# Patient Record
Sex: FEMALE | Race: WHITE | NOT HISPANIC OR LATINO | Employment: OTHER | URBAN - METROPOLITAN AREA
[De-identification: names, ages, dates, MRNs, and addresses within clinical notes are randomized per-mention and may not be internally consistent; named-entity substitution may affect disease eponyms.]

---

## 2017-03-02 ENCOUNTER — OFFICE VISIT (OUTPATIENT)
Dept: FAMILY MEDICINE | Facility: CLINIC | Age: 77
End: 2017-03-02
Payer: COMMERCIAL

## 2017-03-02 VITALS
DIASTOLIC BLOOD PRESSURE: 80 MMHG | OXYGEN SATURATION: 95 % | BODY MASS INDEX: 28.29 KG/M2 | RESPIRATION RATE: 15 BRPM | SYSTOLIC BLOOD PRESSURE: 120 MMHG | HEART RATE: 93 BPM | WEIGHT: 170 LBS

## 2017-03-02 DIAGNOSIS — R00.0 TACHYCARDIA: ICD-10-CM

## 2017-03-02 DIAGNOSIS — L21.9 SEBORRHEIC DERMATITIS: ICD-10-CM

## 2017-03-02 DIAGNOSIS — F41.9 ANXIETY: Primary | ICD-10-CM

## 2017-03-02 PROCEDURE — 99214 OFFICE O/P EST MOD 30 MIN: CPT | Performed by: INTERNAL MEDICINE

## 2017-03-02 PROCEDURE — 93000 ELECTROCARDIOGRAM COMPLETE: CPT | Performed by: INTERNAL MEDICINE

## 2017-03-02 RX ORDER — HYDROXYZINE PAMOATE 25 MG/1
25 CAPSULE ORAL 3 TIMES DAILY PRN
Qty: 20 CAPSULE | Refills: 1 | Status: SHIPPED | OUTPATIENT
Start: 2017-03-02 | End: 2017-10-30

## 2017-03-02 RX ORDER — TRIAMCINOLONE ACETONIDE 1 MG/G
CREAM TOPICAL
Qty: 30 G | Refills: 0 | Status: SHIPPED | OUTPATIENT
Start: 2017-03-02 | End: 2017-10-30

## 2017-03-02 RX ORDER — ESTRADIOL 0.1 MG/G
1 CREAM VAGINAL
COMMUNITY
Start: 2017-02-24 | End: 2018-07-16

## 2017-03-02 RX ORDER — CITALOPRAM HYDROBROMIDE 10 MG/1
10 TABLET ORAL DAILY
Qty: 30 TABLET | Refills: 3 | Status: SHIPPED | OUTPATIENT
Start: 2017-03-02 | End: 2017-03-09

## 2017-03-02 NOTE — PROGRESS NOTES
SUBJECTIVE:                                                    Sommer Jaimes is a 76 year old female who presents to clinic today for the following health issues:    Concern - has been feeling anxious and had a couple of spells in the past couple of days     Onset: few episodes in the past 24 hours    Description:   Feels anxious and heart racing    Intensity: moderate    Progression of Symptoms: same and intermittent    Accompanying Signs & Symptoms:  Just feels very anxious   Previous history of similar problem: yes    Precipitating factors:   Worsened by: uncertain    Alleviating factors:  Improved by: Has taken a medication that starts with an M ? (meclizine)       Therapies Tried and outcome: unsure what she has tried.     Problem list and histories reviewed & adjusted, as indicated.  Additional history: as documented  Labs reviewed in EPIC    BP Readings from Last 3 Encounters:   03/02/17 120/80   10/19/16 124/70   03/11/16 128/62    Wt Readings from Last 3 Encounters:   03/02/17 170 lb (77.1 kg)   10/19/16 179 lb 9.6 oz (81.5 kg)   11/13/15 175 lb (79.4 kg)        Reviewed and updated as needed this visit by clinical staff  Tobacco  Allergies  Meds  Problems  Med Hx  Surg Hx  Fam Hx  Soc Hx        Reviewed and updated as needed this visit by Provider  Allergies  Meds  Problems       ROS:  CONSTITUTIONAL: NEGATIVE for fever, chills, change in weight  RESP:NEGATIVE for significant cough or SOB  CV: POSITIVE for intermittent heart racing over the past few days, NEGATIVE for chest pain or peripheral edema  GI: no change in bowels  : no urinary symptoms.  NEURO: NEGATIVE for weakness, dizziness or paresthesias  PSYCHIATRIC: POSITIVE for episodes of anxiety the past few days, POSITIVE for recent loss of close sister (11/2016) and mother of daughter-in-law (last week)    This document serves as a record of the services and decisions personally performed and made by Mera Gupta MD. It was created  on her behalf by Courtney Archer, a trained medical scribe. The creation of this document is based on the provider's statements to the medical scribe.   Courtney Archer, 2:08 PM, March 2, 2017    OBJECTIVE:                                                    /80 (BP Location: Left arm, Patient Position: Chair, Cuff Size: Adult Large)  Pulse 93  Resp 15  Wt 170 lb (77.1 kg)  SpO2 95%  BMI 28.29 kg/m2  Body mass index is 28.29 kg/(m^2).  GENERAL APPEARANCE: healthy, alert and no distress  NECK: no bruits  RESP: lungs clear to auscultation - no rales, rhonchi or wheezes  CV: regular rates and rhythm with occasional ectopic beat, normal S1 S2  MS: extremities normal- no gross deformities noted  NEURO: mentation intact and speech normal  PSYCH: mentation appears normal and affect normal/bright  Skin: thickened scale at base of scalp at nape of neck; no ulceration or cellulitis    Diagnostic Test Results:  EKG - independent review at appt: normal sinus rhythm with occasional ectopic beats; rate 89     ASSESSMENT/PLAN:                                                    (F41.9) Anxiety (primary encounter diagnosis)  Comment: increased anxiety over the past few days, recent losses - sister and mother of daughter-in-law; pt had prescription of hydroxyzine for anxiety, but was using meclizine instead; begin use of citalopram daily and use of hydroxyzine as needed  Plan: hydrOXYzine (VISTARIL) 25 MG capsule,         citalopram (CELEXA) 10 MG tablet          (R00.0) Tachycardia  Comment: EKG reviewed, normal sinus rhythm with occasional ectopic beats; rate 89  Plan: EKG 12-lead complete w/read - Clinics          (L21.9) Seborrheic dermatitis  Comment: affected area to scalp behind left ear, cream helpful when used, pt reports only occasional use, begin use more regularly  Plan: triamcinolone (KENALOG) 0.1 % cream          Fish oil or krill oil for cholesterol, patient not interested in cholesterol medication     MEDICATIONS:    Orders Placed This Encounter   Medications     triamcinolone (KENALOG) 0.1 % cream     Sig: Apply sparingly to affected area three times daily for 14 days.     Dispense:  30 g     Refill:  0     ESTRACE VAGINAL 0.1 MG/GM cream     Sig: Place 1 g vaginally twice a week     hydrOXYzine (VISTARIL) 25 MG capsule     Sig: Take 1 capsule (25 mg) by mouth 3 times daily as needed for itching or anxiety     Dispense:  20 capsule     Refill:  1     citalopram (CELEXA) 10 MG tablet     Sig: Take 1 tablet (10 mg) by mouth daily     Dispense:  30 tablet     Refill:  3     Medications Discontinued During This Encounter   Medication Reason     triamcinolone (KENALOG) 0.1 % cream Reorder     hydrOXYzine (VISTARIL) 25 MG capsule Reorder     FUTURE APPOINTMENTS:       - Follow-up visit in 6-8 weeks    Mera Gupta MD  Internal Medicine  [facility]    The information in this document, created by a medical scribe for me, accurately reflects the services I personally performed and the decisions made by me. I have reviewed and approved this document for accuracy.  Dr. Mera Gupta, 2:24 PM, March 2, 2017

## 2017-03-02 NOTE — NURSING NOTE
"Chief Complaint   Patient presents with     Anxiety     2 deaths recent of people close to her   depression and anxiety    felt like heart was racing  sudden onset.   states took a few anxiety pills several years ago but never refilled the RX.         Initial /80 (BP Location: Left arm, Patient Position: Chair, Cuff Size: Adult Large)  Pulse 93  Resp 15  Wt 170 lb (77.1 kg)  SpO2 95%  BMI 28.29 kg/m2 Estimated body mass index is 28.29 kg/(m^2) as calculated from the following:    Height as of 10/19/16: 5' 5\" (1.651 m).    Weight as of this encounter: 170 lb (77.1 kg).  Medication Reconciliation: complete    "

## 2017-03-02 NOTE — MR AVS SNAPSHOT
"              After Visit Summary   3/2/2017    Sommer Jaimes    MRN: [id number]           Patient Information     Date Of Birth          1940        Visit Information        Provider Department      3/2/2017 1:00 PM Mera Gupta MD [facility]        Today's Diagnoses     Anxiety    -  1    Tachycardia        Seborrheic dermatitis           Follow-ups after your visit        Who to contact     If you have questions or need follow up information about today's clinic visit or your schedule please contact [facility] directly at [phone].  Normal or non-critical lab and imaging results will be communicated to you by [employer], letter or phone within 4 business days after the clinic has received the results. If you do not hear from us within 7 days, please contact the clinic through [employer] or phone. If you have a critical or abnormal lab result, we will notify you by phone as soon as possible.  Submit refill requests through [employer] or call your pharmacy and they will forward the refill request to us. Please allow 3 business days for your refill to be completed.          Additional Information About Your Visit        MyChart Information     [employer] lets you send messages to your doctor, view your test results, renew your prescriptions, schedule appointments and more. To sign up, go to [email or web address] . Click on \"Log in\" on the left side of the screen, which will take you to the Welcome page. Then click on \"Sign up Now\" on the right side of the page.     You will be asked to enter the access code listed below, as well as some personal information. Please follow the directions to create your username and password.     Your access code is: [email or web address]  Expires: 2017  2:22 PM     Your access code will  in 90 days. If you need help or a new code, please call your [facility] or [phone].        Care EveryWhere ID     This is your Care EveryWhere " ID. This could be used by other organizations to access your [place] medical records  [id number]        Your Vitals Were     Pulse Respirations Pulse Oximetry BMI (Body Mass Index)          93 15 95% 28.29 kg/m2         Blood Pressure from Last 3 Encounters:   03/02/17 120/80   10/19/16 124/70   03/11/16 128/62    Weight from Last 3 Encounters:   03/02/17 170 lb (77.1 kg)   10/19/16 179 lb 9.6 oz (81.5 kg)   11/13/15 175 lb (79.4 kg)              We Performed the Following     EKG 12-lead complete w/read - Clinics          Today's Medication Changes          These changes are accurate as of: 3/2/17  2:22 PM.  If you have any questions, ask your nurse or doctor.               Start taking these medicines.        Dose/Directions    citalopram 10 MG tablet   Commonly known as:  celeXA   Used for:  Anxiety   Started by:  Mera Gupta MD        Dose:  10 mg   Take 1 tablet (10 mg) by mouth daily   Quantity:  30 tablet   Refills:  3            Where to get your medicines      [place] [facility] [place] [place] [address]  [address], [facility] [address]     Phone:  [phone]     citalopram 10 MG tablet    hydrOXYzine 25 MG capsule    triamcinolone 0.1 % cream                Primary Care Provider Office Phone # Fax #    Mera Gupta [phone]       [facility] [address]  [facility] [address]        Thank you!     Thank you for choosing [facility]  for your care. Our goal is always to provide you with excellent care. Hearing back from our patients is one way we can continue to improve our services. Please take a few minutes to complete the written survey that you may receive in the mail after your visit with us. Thank you!             Your Updated Medication List - Protect others around you: Learn how to safely use, store and throw away your medicines at www.disposemymeds.org.          This  list is accurate as of: 3/2/17  2:22 PM.  Always use your most recent med list.                   Brand Name Dispense Instructions for use    calcium + D 600-200 MG-UNIT Tabs   Generic drug:  calcium carbonate-vitamin D      Take  by mouth.       CENTRUM SILVER per tablet      Take 1 tablet by mouth daily.       citalopram 10 MG tablet    celeXA    30 tablet    Take 1 tablet (10 mg) by mouth daily       COQ-10 PO      Take 1 tablet by mouth daily       ESTRACE VAGINAL 0.1 MG/GM cream   Generic drug:  estradiol      Place 1 g vaginally twice a week       hydrOXYzine 25 MG capsule    VISTARIL    20 capsule    Take 1 capsule (25 mg) by mouth 3 times daily as needed for itching or anxiety       PROBIOTIC DAILY PO      Take by mouth daily       triamcinolone 0.1 % cream    KENALOG    30 g    Apply sparingly to affected area three times daily for 14 days.

## 2017-03-09 ENCOUNTER — TELEPHONE (OUTPATIENT)
Dept: FAMILY MEDICINE | Facility: CLINIC | Age: 77
End: 2017-03-09

## 2017-03-09 NOTE — TELEPHONE ENCOUNTER
"Patient called to let pcp know she is no longer taking citalopram. Started med 3/2. Stated first night could not sleep. After taking second dose had \"horrible\" diarrhea for whole day. Has not taken since. Patient states is feeling better and does not want any alternative medication. Is also \"backing off\" of vistaril.    Will follow up if needed.    Citalopram removed from med list.    Sheri Velazco RN    "

## 2017-05-10 ENCOUNTER — OFFICE VISIT (OUTPATIENT)
Dept: FAMILY MEDICINE | Facility: CLINIC | Age: 77
End: 2017-05-10
Payer: COMMERCIAL

## 2017-05-10 VITALS
RESPIRATION RATE: 16 BRPM | DIASTOLIC BLOOD PRESSURE: 64 MMHG | OXYGEN SATURATION: 96 % | SYSTOLIC BLOOD PRESSURE: 130 MMHG | HEART RATE: 70 BPM | TEMPERATURE: 98.3 F | HEIGHT: 67 IN

## 2017-05-10 DIAGNOSIS — R42 DIZZINESS: ICD-10-CM

## 2017-05-10 DIAGNOSIS — F43.0 STRESS REACTION: Primary | ICD-10-CM

## 2017-05-10 PROCEDURE — 99214 OFFICE O/P EST MOD 30 MIN: CPT | Performed by: INTERNAL MEDICINE

## 2017-05-10 RX ORDER — SERTRALINE HYDROCHLORIDE 25 MG/1
12.5 TABLET, FILM COATED ORAL DAILY
Qty: 30 TABLET | Refills: 3 | Status: SHIPPED | OUTPATIENT
Start: 2017-05-10 | End: 2017-10-30

## 2017-05-10 NOTE — MR AVS SNAPSHOT
After Visit Summary   5/10/2017    Sommer Jaimes    MRN: [id number]           Patient Information     Date Of Birth          1940        Visit Information        Provider Department      5/10/2017 10:00 AM [facility] MD [facility]        Today's Diagnoses     Stress reaction    -  1    Dizziness           Follow-ups after your visit        Additional Services     PHYSICAL THERAPY REFERRAL       *This therapy referral will be filtered to a centralized scheduling office at [facility] and the patient will receive a call to schedule an appointment at a [place] location most convenient for them. *     [facility] provides Physical Therapy evaluation and treatment and many specialty services across the [place] system.  If requesting a specialty program, please choose from the list below.    If you have not heard from the scheduling office within 2 business days, please call [phone] for all locations, with the exception of [place], please call [phone].  Treatment: Evaluation & Treatment  Special Instructions/Modalities:   Special Programs: Balance/Vestibular    Please be aware that coverage of these services is subject to the terms and limitations of your health insurance plan.  Call member services at your health plan with any benefit or coverage questions.      **Note to Provider:  If you are referring outside of [place] for the therapy appointment, please list the name of the location in the  special instructions  above, print the referral and give to the patient to schedule the appointment.                  Future tests that were ordered for you today     Open Future Orders        Priority Expected Expires Ordered    US Carotid Bilateral Routine  5/10/2018 5/10/2017            Who to contact     If you have questions or need follow up information about today's clinic visit or your schedule please contact [place]  [facility] directly at [phone].  Normal or non-critical lab and imaging results will be communicated to you by MyChart, letter or phone within 4 business days after the clinic has received the results. If you do not hear from us within 7 days, please contact the clinic through [employer] or phone. If you have a critical or abnormal lab result, we will notify you by phone as soon as possible.  Submit refill requests through [employer] or call your pharmacy and they will forward the refill request to us. Please allow 3 business days for your refill to be completed.          Additional Information About Your Visit        [employer] Information     [employer] lets you send messages to your doctor, view your test results, renew your prescriptions, schedule appointments and more. To sign up, go to [email or web address] . Click on \"Log in\" on the left side of the screen, which will take you to the Welcome page. Then click on \"Sign up Now\" on the right side of the page.     You will be asked to enter the access code listed below, as well as some personal information. Please follow the directions to create your username and password.     Your access code is: [email or web address]  Expires: 2017  3:22 PM     Your access code will  in 90 days. If you need help or a new code, please call your [place] clinic or [phone].        Care EveryWhere ID     This is your Care EveryWhere ID. This could be used by other organizations to access your [place] medical records  [id number]        Your Vitals Were     Pulse Temperature Respirations Height Pulse Oximetry Breastfeeding?    70 98.3  F (36.8  C) (Oral) 16 5' 6.5\" (1.689 m) 96% No       Blood Pressure from Last 3 Encounters:   05/10/17 130/64   17 120/80   10/19/16 124/70    Weight from Last 3 Encounters:   17 170 lb (77.1 kg)   10/19/16 179 lb 9.6 oz (81.5 kg)   11/13/15 175 lb (79.4 kg)              We Performed the Following     PHYSICAL THERAPY REFERRAL  "         Today's Medication Changes          These changes are accurate as of: 5/10/17 10:25 AM.  If you have any questions, ask your nurse or doctor.               Start taking these medicines.        Dose/Directions    sertraline 25 MG tablet   Commonly known as:  ZOLOFT   Used for:  Stress reaction   Started by:  Mera Gupta MD        Dose:  12.5 mg   Take 0.5 tablets (12.5 mg) by mouth daily After 2-3 weeks may increase to 25 mg per day   Quantity:  30 tablet   Refills:  3            Where to get your medicines      These medications were sent to [facility] [place] - [address]  [address] [facility] [address]     Phone:  [phone]     sertraline 25 MG tablet                Primary Care Provider Office Phone # Fax #    Mera Gupta [phone]       [facility] [address] [facility] [address]        Thank you!     Thank you for choosing [facility]  for your care. Our goal is always to provide you with excellent care. Hearing back from our patients is one way we can continue to improve our services. Please take a few minutes to complete the written survey that you may receive in the mail after your visit with us. Thank you!             Your Updated Medication List - Protect others around you: Learn how to safely use, store and throw away your medicines at www.disposemymeds.org.          This list is accurate as of: 5/10/17 10:25 AM.  Always use your most recent med list.                   Brand Name Dispense Instructions for use    calcium + D 600-200 MG-UNIT Tabs   Generic drug:  calcium carbonate-vitamin D      Take  by mouth.       CENTRUM SILVER per tablet      Take 1 tablet by mouth daily.       COQ-10 PO      Take 1 tablet by mouth daily       ESTRACE VAGINAL 0.1 MG/GM cream   Generic drug:  estradiol      Place 1 g vaginally twice a week       hydrOXYzine 25 MG capsule    VISTARIL    20 capsule     Take 1 capsule (25 mg) by mouth 3 times daily as needed for itching or anxiety       PROBIOTIC DAILY PO      Take by mouth daily       sertraline 25 MG tablet    ZOLOFT    30 tablet    Take 0.5 tablets (12.5 mg) by mouth daily After 2-3 weeks may increase to 25 mg per day       triamcinolone 0.1 % cream    KENALOG    30 g    Apply sparingly to affected area three times daily for 14 days.

## 2017-05-10 NOTE — NURSING NOTE
"Chief Complaint   Patient presents with     Dizziness       Initial /64 (BP Location: Right arm, Patient Position: Chair, Cuff Size: Adult Large)  Pulse 70  Temp 98.3  F (36.8  C) (Oral)  Resp 16  Ht 5' 6.5\" (1.689 m)  SpO2 96%  Breastfeeding? No Estimated body mass index is 28.29 kg/(m^2) as calculated from the following:    Height as of 10/19/16: 5' 5\" (1.651 m).    Weight as of 3/2/17: 170 lb (77.1 kg).  Medication Reconciliation: complete     Aga Damon CMA (AAMA) 5/10/2017 10:00 AM      "

## 2017-05-10 NOTE — PROGRESS NOTES
"  SUBJECTIVE:                                                    Sommer Jaimes is a 76 year old female who presents to clinic today for the following health issues:      Dizziness    Duration: Intermittently for one week. She had a dental implant placed on February 3rd, where she was lying back in their dental bed, and she felt dizzy upon standing back up. Additionally, she went into a massage last week and she felt dizzy upon standing again when the massage was completed.    Description   Feeling faint:  no   Feeling like the surroundings are moving: no   Loss of consciousness or falls: no     Intensity: Moderate    Accompanying signs and symptoms:   Nausea/vomitting: no   Palpitations: no   Weakness in arms or legs: no   Vision or speech changes: no   Ringing in ears (Tinnitus): no   Hearing loss related to dizziness: no   Other (fevers/chills/sweating/dyspnea): YES - Feels \"off\"    History (similar episodes/head trauma/previous evaluation/recent bleeding): None    Precipitating or alleviating factors (new meds/chemicals): Happens only when standing up too fast  Worse with activity/head movement: YES    Therapies tried and outcome: None       Problem list and histories reviewed & adjusted, as indicated.  Additional history: as documented    Labs reviewed in EPIC    Reviewed and updated as needed this visit by clinical staff       Reviewed and updated as needed this visit by Provider         REVIEW OF SYSTEMS:  C: NEGATIVE for fever, chills, or change in weight  R: NEGATIVE for significant cough or SOB  CV: NEGATIVE for chest pain, palpitations or peripheral edema  GI: no change in bowel function  M: NEGATIVE for significant arthralgias or myalgia  N: POSITIVE for intermittent dizziness upon standing; NEGATIVE for weakness or paresthesias  P: NEGATIVE for changes in mood or affect    This document serves as a record of the services and decisions personally performed and made by Mera Gupta MD. It was created " "on her behalf by Cheri Mackay, a trained medical scribe. The creation of this document is based the provider's statements to the medical scribe.  Cheri Mackay, May 10, 2017 10:10 AM     OBJECTIVE:                                                    /64 (BP Location: Right arm, Patient Position: Chair, Cuff Size: Adult Large)  Pulse 70  Temp 98.3  F (36.8  C) (Oral)  Resp 16  Ht 1.689 m (5' 6.5\")  SpO2 96%  Breastfeeding? No  There is no height or weight on file to calculate BMI.    EXAM:  GENERAL: Patient appears healthy, alert and not distressed.  EYES: Eyes appear grossly normal to inspection, with normal conjunctivae and sclerae, EOMI  HENT: Ear canals and TM's appear normal, mouth is without ulcers or lesions, oropharynx is clear and oral mucous membranes are moist  NECK: No adenopathy present, no asymmetry, masses, or scars noted, thyroid is normal to palpation  RESP: Lungs are clear to auscultation - no rales, rhonchi or wheezes present  CV: Regular rate and rhythm, normal S1 S2 heart sounds, no ectopy, no peripheral edema present, peripheral pulses normal, no carotid bruit.  Ab: soft,  Nontender; bowel sounds present  MS: No gross musculoskeletal defects noted, no edema, gait is age appropriate without ataxia  NEURO: Patient exhibits normal strength and tone, normal speech and mentation  PSYCH: Mentation appears normal, affect is normal/bright    Diagnostic Test Results:  No results found for this or any previous visit (from the past 24 hour(s)).      ASSESSMENT/PLAN:                                                      (F43.0) Stress reaction  (primary encounter diagnosis)  Comment: Patient is sensitive to medications and could not tolerate Citalopram or Alprazolam. She continues to take Hydroxyzine as prescribed, but states \"her head feels weird\". She is interested in trying a different daily medication. Starting on Zoloft 25 MG at half the dose; 12.5 mg per day. She prefers NOT to use the " "Hydroxyzine for breakthrough symptoms  Plan: sertraline (ZOLOFT) 25 MG tablet          (R42) Dizziness  Comment: Denies vertigo, but states \"her head feels weird\" while moving quickly or when her neck is extended backwards for extended periods of time. Referred to physical therapy, and advised to follow-up for a carotid ultrasound to detect blood flow changes to head.  Plan: US Carotid Bilateral, PHYSICAL THERAPY REFERRAL              The information in this document, created by a medical scribe for me, accurately reflects the services I personally performed and the decisions made by me. I have reviewed and approved this document for accuracy.  Dr. Mera Gupta, May 10, 2017, 10:31 AM     Mera Gupta MD  Internal Medicine   [facility]    "

## 2017-05-16 ENCOUNTER — HOSPITAL ENCOUNTER (OUTPATIENT)
Dept: ULTRASOUND IMAGING | Facility: CLINIC | Age: 77
Discharge: HOME | End: 2017-05-16
Attending: INTERNAL MEDICINE | Admitting: INTERNAL MEDICINE
Payer: COMMERCIAL

## 2017-05-16 DIAGNOSIS — R42 DIZZINESS: ICD-10-CM

## 2017-05-16 PROCEDURE — 93880 EXTRACRANIAL BILAT STUDY: CPT

## 2017-05-17 ENCOUNTER — HOSPITAL ENCOUNTER (OUTPATIENT)
Dept: PHYSICAL THERAPY | Facility: CLINIC | Age: 77
End: 2017-05-17
Attending: INTERNAL MEDICINE
Payer: COMMERCIAL

## 2017-05-17 DIAGNOSIS — R42 DIZZINESS: Primary | ICD-10-CM

## 2017-05-17 PROCEDURE — G8979 MOBILITY GOAL STATUS: HCPCS | Mod: GP | Performed by: PHYSICAL THERAPIST

## 2017-05-17 PROCEDURE — G8978 MOBILITY CURRENT STATUS: HCPCS | Mod: GP | Performed by: PHYSICAL THERAPIST

## 2017-05-17 PROCEDURE — 40000840 ZZHC STATISTIC PT VESTIBULAR VISIT: Mod: GP | Performed by: PHYSICAL THERAPIST

## 2017-05-20 NOTE — PROGRESS NOTES
" 05/17/17 1000   Quick Adds   Quick Adds Vestibular Eval   Type of Visit Initial OP PT Evaluation   General Information   Start of Care Date 05/17/17   Referring Physician Mera Gupta MD    Orders Evaluate and Treat as Indicated   Medical Diagnosis Dizziness R42    Onset of illness/injury or Date of Surgery 05/17/17   Pertinent history of current problem (include personal factors and/or comorbidities that impact the POC) Dizziness started when I was at the dentist, reoccured when getting up from a message later in the week. Dizziness lasts a couple seconds. Denying room spinning sensation- unable to describe dizziness further. Reporting that she does have ringing in the ears- although this has been going on for years. Denying vision changes but reports that she does have a cataract. Per subjective reporting, dizziness does not appear to by consistent with positional changes. However, sudden movements do set off dizziness. Increased motion sensitivity while at a movie recently. Reports having headaches- feels this may be allergies or sinuses. Not having any dizziness this date.  Had Carotid US yesterday- patient reporting that the results came back fine. States that she just isn't \"feeling like myself\" and that balance has been a bit off.    Prior level of function comment IND with all mobility   Patient role/Employment history Retired  (Nursing)   Assistive Devices Comments Does not utilize an AD at this time   Patient/Family Goals Statement Resolve dizziness   Fall Risk Screen   Fall screen completed by PT   Per patient - Fall 2 or more times in past year? No   Per patient - Fall with injury in past year? No   Is patient a fall risk? No   System Outcome Measures   Outcome Measures BPPV   Dizziness Handicap Inventory (score out of 100) A decrease in score by 17.18 or greater indicates a clinically significant change in symptoms. 12   Was BPPV resolved at end of session? Yes   Pain   Pain comments Sukhwinder " denying pain   Cognitive Status Examination   Orientation orientation to person, place and time   Level of Consciousness alert   Follows Commands and Answers Questions 100% of the time   Personal Safety and Judgment intact   Memory intact   Posture   Posture Forward head position;Protracted shoulders   Range of Motion (ROM)   ROM Comment Functionally assessed through gait, bed mobility, and trnasfers. Bilateral LEs and UEs determined to be within functional norms.    Strength   Strength Comments Functionally assessed through general mobility screening. Bialteral LEs and UEs determined to be graded at least 3/5 as patient is able to lift bilateral extremities against gravity without difficulty.    Bed Mobility   Bed Mobility Comments IND   Transfer Skills   Transfer Comments IND   Gait   Gait Comments IND   Balance   Balance Comments No overt concerns for balance; demonstrating goo stability with gait, trnasfers and positional changes.    Sensory Examination   Sensory Perception no deficits were identified   Coordination   Coordination no deficits were identified   Muscle Tone   Muscle Tone no deficits were identified   Cervicogenic Screen   Neck ROM Cervical ROM screened; determined to be within normal limits and within approrpiate range for positional testing.    Vertebral Artery Test Normal   Oculomotor Exam   Smooth Pursuit Normal   Saccades Normal   VOR Normal   Rapid Head Thrust Corrective Saccade R head thrust   Rapid Head Thrust Comments Corrective saccade noted on one occasion. Patient significantly guarding against motion- difficult to assess acuurately.    Convergence Testing Normal   Infrared Goggle Exam or Frenzel Lense Exam   Vestibular Suppressant in Last 24 Hours? No   Exam completed with Infrared Goggles   Spontaneous Nystagmus Negative   Gaze Evoked Nystagmus Negative   Katharine-Hallpike (right) Upbeating R torsional   Prospect-Hallpike (right) comments Patient demonstrating large amplitude and significant  response to positional testing. Patient very anxious about symptoms experienced. Decided to treat R posterior canal due to positive test. Unable to assess other canals this session. Nystagmus lasting approximately 12 seconds.    BPPV Canal(s) R Posterior   BPPV Type Canalithasis   Planned Therapy Interventions   Planned Therapy Interventions balance training;gait training;neuromuscular re-education;ROM;strengthening;stretching;transfer training   Clinical Impression   Criteria for Skilled Therapeutic Interventions Met yes, treatment indicated   PT Diagnosis Decreased safe functional mobility   Influenced by the following impairments Dizziness with positional changes   Functional limitations due to impairments Decreased safety with transfers, gait, and positional changes   Clinical Presentation Stable/Uncomplicated   Clinical Decision Making (Complexity) Low complexity   Therapy Frequency 1 time/week   Predicted Duration of Therapy Intervention (days/wks) 6 weeks   Risk & Benefits of therapy have been explained Yes   Patient, Family & other staff in agreement with plan of care Yes   Clinical Impression Comments Patient resenting with long standing dizziness. Objective findings indicate R posterior canal BPPV. The patient will benefit from skilled PT services to address identified limitations and facilitate return to baseline level of function.    GOALS   PT Eval Goals 1;2   Goal 1   Goal Identifier Return to activity   Goal Description The aptietn will report being able to return to all normal activity without restriction due to dizziness.   Target Date 06/28/17   Goal 2   Goal Identifier DHI   Goal Description The patient will score 0/100 on DHI assessment to demonstrate a significant improvement in dizziness symptoms and the subjective percetion of handicap assoiated with dizziness.    Target Date 06/28/17   Total Evaluation Time   Total Evaluation Time (Minutes) 40

## 2017-05-20 NOTE — ADDENDUM NOTE
Encounter addended by: Katarina Patterson PT on: 5/19/2017  9:27 PM<BR>     Actions taken: Flowsheet accepted

## 2017-06-07 ENCOUNTER — HOSPITAL ENCOUNTER (OUTPATIENT)
Dept: PHYSICAL THERAPY | Facility: CLINIC | Age: 77
End: 2017-06-07
Payer: COMMERCIAL

## 2017-06-07 DIAGNOSIS — R42 DIZZINESS: Primary | ICD-10-CM

## 2017-06-07 PROCEDURE — 97112 NEUROMUSCULAR REEDUCATION: CPT | Mod: GP | Performed by: PHYSICAL THERAPIST

## 2017-06-07 PROCEDURE — 40000840 ZZHC STATISTIC PT VESTIBULAR VISIT: Mod: GP | Performed by: PHYSICAL THERAPIST

## 2017-06-14 ENCOUNTER — HOSPITAL ENCOUNTER (OUTPATIENT)
Dept: PHYSICAL THERAPY | Facility: CLINIC | Age: 77
End: 2017-06-14
Payer: COMMERCIAL

## 2017-06-14 DIAGNOSIS — R42 DIZZINESS: Primary | ICD-10-CM

## 2017-06-14 PROCEDURE — 97112 NEUROMUSCULAR REEDUCATION: CPT | Mod: GP | Performed by: PHYSICAL THERAPIST

## 2017-06-16 ENCOUNTER — HOSPITAL ENCOUNTER (OUTPATIENT)
Dept: PHYSICAL THERAPY | Facility: CLINIC | Age: 77
End: 2017-06-16
Payer: COMMERCIAL

## 2017-06-16 DIAGNOSIS — R42 DIZZINESS: Primary | ICD-10-CM

## 2017-06-16 PROCEDURE — 95992 CANALITH REPOSITIONING PROC: CPT | Mod: GP | Performed by: PHYSICAL THERAPIST

## 2017-06-16 PROCEDURE — 40000840 ZZHC STATISTIC PT VESTIBULAR VISIT: Mod: GP | Performed by: PHYSICAL THERAPIST

## 2017-06-16 PROCEDURE — 97112 NEUROMUSCULAR REEDUCATION: CPT | Mod: GP | Performed by: PHYSICAL THERAPIST

## 2017-06-29 ENCOUNTER — HOSPITAL ENCOUNTER (OUTPATIENT)
Dept: PHYSICAL THERAPY | Facility: CLINIC | Age: 77
End: 2017-06-29
Payer: COMMERCIAL

## 2017-06-29 DIAGNOSIS — R42 DIZZINESS: Primary | ICD-10-CM

## 2017-06-29 PROCEDURE — 95992 CANALITH REPOSITIONING PROC: CPT | Mod: GP | Performed by: PHYSICAL THERAPIST

## 2017-06-29 PROCEDURE — 97112 NEUROMUSCULAR REEDUCATION: CPT | Mod: GP | Performed by: PHYSICAL THERAPIST

## 2017-06-29 PROCEDURE — 40000719 ZZHC STATISTIC PT DEPARTMENT NEURO VISIT: Mod: GP | Performed by: PHYSICAL THERAPIST

## 2017-07-25 ENCOUNTER — HOSPITAL ENCOUNTER (OUTPATIENT)
Dept: PHYSICAL THERAPY | Facility: CLINIC | Age: 77
End: 2017-07-25
Payer: COMMERCIAL

## 2017-07-25 DIAGNOSIS — R42 DIZZINESS: Primary | ICD-10-CM

## 2017-07-25 PROCEDURE — 97112 NEUROMUSCULAR REEDUCATION: CPT | Mod: GP | Performed by: PHYSICAL THERAPIST

## 2017-07-25 PROCEDURE — 40000840 ZZHC STATISTIC PT VESTIBULAR VISIT: Mod: GP | Performed by: PHYSICAL THERAPIST

## 2017-07-25 NOTE — PROGRESS NOTES
"Outpatient Physical Therapy Discharge Note     Patient: Sommer Jaimes  : 1940    Beginning/End Dates of Reporting Period:  17 to 2017    Referring Provider: Mera Gupta MD     Therapy Diagnosis: Decreased safe functional mobility     Client Self Report: Patient present, indicating that room spinning sensation hasn't been an issue. The patient reports that this has not occurred since our previous visit- has not had to use the self treatment method. Notes that she will occasionally continue to feel a \"little bit off,\" though believes this to be related to her vision. Notes that she has a worsenning cataract in her L eye. Is planning to see the eye doctor in a few weeks.     Objective Measurements:  Objective Measure: DHI  Details:         Outcome Measures (most recent score): 2100              Goals:  Goal Identifier MET: return to activity   Goal Description The kalpanaietn will report being able to return to all normal activity without restriction due to dizziness.   Target Date 17   Date Met  17   Progress:     Goal Identifier DHI   Goal Description The patient will score 0/100 on DHI assessment to demonstrate a significant improvement in dizziness symptoms and the subjective percetion of handicap assoiated with dizziness.    Target Date 17   Date Met      Progress: Goal techniqucally not met per patient reporting (DHI score 2/100), however, with further discussion, feels that her dizziness has resolved and that she is unrestricted at this time.     Goal Identifier     Goal Description     Target Date     Date Met      Progress:     Goal Identifier     Goal Description     Target Date     Date Met      Progress:     Goal Identifier     Goal Description     Target Date     Date Met      Progress:     Goal Identifier     Goal Description     Target Date     Date Met      Progress:     Goal Identifier     Goal Description     Target Date     Date Met      Progress: "     Goal Identifier     Goal Description     Target Date     Date Met      Progress:     Progress Toward Goals:   Progress this reporting period: see above        Plan:  Discharge from therapy.    Discharge:    Reason for Discharge: Patient has met all goals.    Equipment Issued: none    Discharge Plan: Patient to continue home program as needed

## 2017-07-25 NOTE — ADDENDUM NOTE
Encounter addended by: Katarina Patterson PT on: 7/25/2017  3:17 PM<BR>     Actions taken: Episode resolved

## 2017-08-30 ENCOUNTER — TRANSFERRED RECORDS (OUTPATIENT)
Dept: HEALTH INFORMATION MANAGEMENT | Facility: CLINIC | Age: 77
End: 2017-08-30

## 2017-09-20 ENCOUNTER — RADIANT APPOINTMENT (OUTPATIENT)
Dept: GENERAL RADIOLOGY | Facility: CLINIC | Age: 77
End: 2017-09-20
Attending: PHYSICIAN ASSISTANT
Payer: COMMERCIAL

## 2017-09-20 ENCOUNTER — OFFICE VISIT (OUTPATIENT)
Dept: FAMILY MEDICINE | Facility: CLINIC | Age: 77
End: 2017-09-20
Payer: COMMERCIAL

## 2017-09-20 VITALS
HEART RATE: 93 BPM | SYSTOLIC BLOOD PRESSURE: 116 MMHG | OXYGEN SATURATION: 94 % | TEMPERATURE: 101.4 F | DIASTOLIC BLOOD PRESSURE: 78 MMHG | RESPIRATION RATE: 16 BRPM | HEIGHT: 67 IN

## 2017-09-20 DIAGNOSIS — R05.9 COUGH: Primary | ICD-10-CM

## 2017-09-20 DIAGNOSIS — R05.9 COUGH: ICD-10-CM

## 2017-09-20 DIAGNOSIS — J18.9 PNEUMONIA DUE TO INFECTIOUS ORGANISM, UNSPECIFIED LATERALITY, UNSPECIFIED PART OF LUNG: ICD-10-CM

## 2017-09-20 LAB
BASOPHILS # BLD AUTO: 0 10E9/L (ref 0–0.2)
BASOPHILS NFR BLD AUTO: 0.2 %
DIFFERENTIAL METHOD BLD: NORMAL
EOSINOPHIL # BLD AUTO: 0 10E9/L (ref 0–0.7)
EOSINOPHIL NFR BLD AUTO: 0.2 %
ERYTHROCYTE [DISTWIDTH] IN BLOOD BY AUTOMATED COUNT: 13.1 % (ref 10–15)
HCT VFR BLD AUTO: 46.5 % (ref 35–47)
HGB BLD-MCNC: 15.2 G/DL (ref 11.7–15.7)
LYMPHOCYTES # BLD AUTO: 1 10E9/L (ref 0.8–5.3)
LYMPHOCYTES NFR BLD AUTO: 17.4 %
MCH RBC QN AUTO: 30.5 PG (ref 26.5–33)
MCHC RBC AUTO-ENTMCNC: 32.7 G/DL (ref 31.5–36.5)
MCV RBC AUTO: 93 FL (ref 78–100)
MONOCYTES # BLD AUTO: 0.5 10E9/L (ref 0–1.3)
MONOCYTES NFR BLD AUTO: 7.7 %
NEUTROPHILS # BLD AUTO: 4.5 10E9/L (ref 1.6–8.3)
NEUTROPHILS NFR BLD AUTO: 74.5 %
PLATELET # BLD AUTO: 218 10E9/L (ref 150–450)
RBC # BLD AUTO: 4.98 10E12/L (ref 3.8–5.2)
WBC # BLD AUTO: 6 10E9/L (ref 4–11)

## 2017-09-20 PROCEDURE — 99214 OFFICE O/P EST MOD 30 MIN: CPT | Performed by: PHYSICIAN ASSISTANT

## 2017-09-20 PROCEDURE — 85025 COMPLETE CBC W/AUTO DIFF WBC: CPT | Performed by: PHYSICIAN ASSISTANT

## 2017-09-20 PROCEDURE — 71020 XR CHEST 2 VW: CPT

## 2017-09-20 PROCEDURE — 36415 COLL VENOUS BLD VENIPUNCTURE: CPT | Performed by: PHYSICIAN ASSISTANT

## 2017-09-20 RX ORDER — LEVOFLOXACIN 500 MG/1
500 TABLET, FILM COATED ORAL DAILY
Qty: 7 TABLET | Refills: 0 | Status: CANCELLED | OUTPATIENT
Start: 2017-09-20

## 2017-09-20 RX ORDER — DOXYCYCLINE HYCLATE 100 MG
100 TABLET ORAL 2 TIMES DAILY
Qty: 20 TABLET | Refills: 0 | Status: SHIPPED | OUTPATIENT
Start: 2017-09-20 | End: 2017-10-30

## 2017-09-20 RX ORDER — CODEINE PHOSPHATE AND GUAIFENESIN 10; 100 MG/5ML; MG/5ML
1 SOLUTION ORAL EVERY 8 HOURS PRN
Qty: 120 ML | Refills: 0 | Status: SHIPPED | OUTPATIENT
Start: 2017-09-20 | End: 2017-10-30

## 2017-09-20 NOTE — PROGRESS NOTES
SUBJECTIVE:   Sommer Jaimes is a 76 year old female who presents to clinic today for the following health issues:    RESPIRATORY SYMPTOMS      Duration: Since Saturday     Description  cough and chills    Severity: moderate    Accompanying signs and symptoms: no appetite     History (predisposing factors):  none    Precipitating or alleviating factors: None    Therapies tried and outcome:  Mucinex, DayQuil/NyQuil, nothing is effective.         Patient is here today complaining of a barky cough, now more productive  Ongoing since Saturday and seems to be getting worse  Has not checked temperature, has no thermometer  Has been very hot and cold  + sore throat, runny nose, + bodyaches  No ear pain, no vomiting or diarrhea  Taking OTC without relief  Admits to being tired and decreased appetite.    Problem list and histories reviewed & adjusted, as indicated.  Additional history: as documented    Patient Active Problem List   Diagnosis     Symptomatic menopausal or female climacteric states     Urethral fistula     Hematuria     CARDIOVASCULAR SCREENING; LDL GOAL LESS THAN 160     HYPERLIPIDEMIA LDL GOAL <160     Advanced directives, counseling/discussion     Anxiety     Past Surgical History:   Procedure Laterality Date     C APPENDECTOMY  1973    Appendectomy.     C NONSPECIFIC PROCEDURE      Sebaceous cyst.     C NONSPECIFIC PROCEDURE      Ganglion cyst.     C NONSPECIFIC PROCEDURE      Oral surg./Plastic surg. - chin.     COLONOSCOPY  4/19/2013    colonoscopy     COLONOSCOPY  4/19/2013    Procedure: COLONOSCOPY;  Colonoscopy ;  Surgeon: Harman Becerra MD;  Location:  GI     [facility] COLONOSCOPY THRU STOMA, DIAGNOSTIC  4/02    Dr. Tavarez, normal no polyps     HC FLEX SIGMOIDOSCOPY W/WO QUYNH SPEC BY BRUSH/WASH  1993    Flex sig. and  colonoscopy 2002     HC REMOVAL GALLBLADDER  1973    Cholecystectomy.     TONSILLECTOMY & ADENOIDECTOMY      T&A age 12       Social History   Substance Use Topics     Smoking  status: Never Smoker     Smokeless tobacco: Never Used     Alcohol use 0.0 oz/week     0 Standard drinks or equivalent per week      Comment: very seldom     Family History   Problem Relation Age of Onset     DIABETES Mother      HEART DISEASE Mother      Cancer - colorectal Father      Colon Cancer late 70's     Circulatory Sister      CHF     HEART DISEASE Sister      defib placed     Neurologic Disorder Son      Multiple sclerosis     CEREBROVASCULAR DISEASE Sister      Cancer - colorectal Other      niece in her 50's          Current Outpatient Prescriptions   Medication Sig Dispense Refill     doxycycline (VIBRA-TABS) 100 MG tablet Take 1 tablet (100 mg) by mouth 2 times daily 20 tablet 0     guaiFENesin-codeine (ROBITUSSIN AC) 100-10 MG/5ML SOLN solution Take 5 mLs by mouth every 8 hours as needed 120 mL 0     sertraline (ZOLOFT) 25 MG tablet Take 0.5 tablets (12.5 mg) by mouth daily After 2-3 weeks may increase to 25 mg per day 30 tablet 3     triamcinolone (KENALOG) 0.1 % cream Apply sparingly to affected area three times daily for 14 days. 30 g 0     ESTRACE VAGINAL 0.1 MG/GM cream Place 1 g vaginally twice a week       hydrOXYzine (VISTARIL) 25 MG capsule Take 1 capsule (25 mg) by mouth 3 times daily as needed for itching or anxiety 20 capsule 1     Coenzyme Q10 (COQ-10 PO) Take 1 tablet by mouth daily       Probiotic Product (PROBIOTIC DAILY PO) Take by mouth daily       Multiple Vitamins-Minerals (CENTRUM SILVER) per tablet Take 1 tablet by mouth daily.       calcium carbonate-vitamin D (CALCIUM + D) 600-200 MG-UNIT TABS Take  by mouth.       Allergies   Allergen Reactions     Erythromycin Anaphylaxis     severe reaction, rash and throat swelling.          Reviewed and updated as needed this visit by clinical staff     Reviewed and updated as needed this visit by Provider         ROS:  Constitutional, HEENT, cardiovascular, pulmonary, gi and gu systems are negative, except as otherwise  "noted.      OBJECTIVE:   /78 (BP Location: Right arm, Patient Position: Chair, Cuff Size: Adult Large)  Pulse 93  Temp 101.4  F (38.6  C) (Tympanic)  Resp 16  Ht 5' 6.5\" (1.689 m)  SpO2 94%  There is no height or weight on file to calculate BMI.  GENERAL: appears tired and ill  EYES: Eyes grossly normal to inspection, PERRL and conjunctivae and sclerae normal  HENT: normal cephalic/atraumatic, ear canals and TM's normal, nose and mouth without ulcers or lesions, rhinorrhea clear, oropharynx clear and oral mucous membranes moist  NECK: no adenopathy, no asymmetry, masses, or scars and thyroid normal to palpation  RESP: lungs clear to auscultation - no rales, rhonchi or wheezes and rales right mid posterior lung  CV: regular rate and rhythm, normal S1 S2, no S3 or S4, no murmur, click or rub, no peripheral edema and peripheral pulses strong  ABDOMEN: soft, nontender, no hepatosplenomegaly, no masses and bowel sounds normal  MS: no gross musculoskeletal defects noted, no edema    Diagnostic Test Results:  Results for orders placed or performed in visit on 09/20/17 (from the past 24 hour(s))   CBC with platelets differential   Result Value Ref Range    WBC 6.0 4.0 - 11.0 10e9/L    RBC Count 4.98 3.8 - 5.2 10e12/L    Hemoglobin 15.2 11.7 - 15.7 g/dL    Hematocrit 46.5 35.0 - 47.0 %    MCV 93 78 - 100 fl    MCH 30.5 26.5 - 33.0 pg    MCHC 32.7 31.5 - 36.5 g/dL    RDW 13.1 10.0 - 15.0 %    Platelet Count 218 150 - 450 10e9/L    Diff Method Automated Method     % Neutrophils 74.5 %    % Lymphocytes 17.4 %    % Monocytes 7.7 %    % Eosinophils 0.2 %    % Basophils 0.2 %    Absolute Neutrophil 4.5 1.6 - 8.3 10e9/L    Absolute Lymphocytes 1.0 0.8 - 5.3 10e9/L    Absolute Monocytes 0.5 0.0 - 1.3 10e9/L    Absolute Eosinophils 0.0 0.0 - 0.7 10e9/L    Absolute Basophils 0.0 0.0 - 0.2 10e9/L     Xray: question right lung consolidation  ASSESSMENT/PLAN:             1. Pneumonia due to infectious organism, unspecified " laterality, unspecified part of lung  - doxycycline (VIBRA-TABS) 100 MG tablet; Take 1 tablet (100 mg) by mouth 2 times daily  Dispense: 20 tablet; Refill: 0  - CBC with platelets differential  - guaiFENesin-codeine (ROBITUSSIN AC) 100-10 MG/5ML SOLN solution; Take 5 mLs by mouth every 8 hours as needed  Dispense: 120 mL; Refill: 0    2. Cough  - XR Chest 2 Views; Future    New problem, patient has normal CBC but is febrile in clinic with cough and abnormal lung sounds. Highly concerned for pneumonia. Will treat with Doxycycline to cover for CAP, will also give Cheratussin AC for cough- do not drive or work on this. Advised rest, fluids and OTCs. F/U in clinic in 48 hours (before weekend) if not improving, sooner if worsening.    Risks, benefits and alternatives were discussed with patient. Agreeable to the plan of care.      Yancy Herrera PA-C  [facility]

## 2017-09-20 NOTE — NURSING NOTE
"Chief Complaint   Patient presents with     Cough       Initial /78 (BP Location: Right arm, Patient Position: Chair, Cuff Size: Adult Large)  Pulse 93  Temp 101.4  F (38.6  C) (Tympanic)  Resp 16  Ht 5' 6.5\" (1.689 m)  SpO2 94% Estimated body mass index is 28.29 kg/(m^2) as calculated from the following:    Height as of 10/19/16: 5' 5\" (1.651 m).    Weight as of 3/2/17: 170 lb (77.1 kg).  Medication Reconciliation: complete   Katarina Renee MA       "

## 2017-09-20 NOTE — MR AVS SNAPSHOT
"              After Visit Summary   2017    Sommer Jaimes    MRN: [id number]           Patient Information     Date Of Birth          1940        Visit Information        Provider Department      2017 11:10 AM Yancy Herrera PA-C [facility]        Today's Diagnoses     Cough    -  1    Pneumonia due to infectious organism, unspecified laterality, unspecified part of lung           Follow-ups after your visit        Who to contact     If you have questions or need follow up information about today's clinic visit or your schedule please contact [facility] directly at [phone].  Normal or non-critical lab and imaging results will be communicated to you by [employer], letter or phone within 4 business days after the clinic has received the results. If you do not hear from us within 7 days, please contact the clinic through [employer] or phone. If you have a critical or abnormal lab result, we will notify you by phone as soon as possible.  Submit refill requests through [employer] or call your pharmacy and they will forward the refill request to us. Please allow 3 business days for your refill to be completed.          Additional Information About Your Visit        MyChart Information     [employer] lets you send messages to your doctor, view your test results, renew your prescriptions, schedule appointments and more. To sign up, go to [email or web address] . Click on \"Log in\" on the left side of the screen, which will take you to the Welcome page. Then click on \"Sign up Now\" on the right side of the page.     You will be asked to enter the access code listed below, as well as some personal information. Please follow the directions to create your username and password.     Your access code is: [email or web address]  Expires: 2017 12:02 PM     Your access code will  in 90 days. If you need help or a new code, please call your [facility] clinic or [phone].      " "  Care EveryWhere ID     This is your Care EveryWhere ID. This could be used by other organizations to access your [facility] medical records  [id number]        Your Vitals Were     Pulse Temperature Respirations Height Pulse Oximetry       93 101.4  F (38.6  C) (Tympanic) 16 5' 6.5\" (1.689 m) 94%        Blood Pressure from Last 3 Encounters:   09/20/17 116/78   05/10/17 130/64   03/02/17 120/80    Weight from Last 3 Encounters:   03/02/17 170 lb (77.1 kg)   10/19/16 179 lb 9.6 oz (81.5 kg)   11/13/15 175 lb (79.4 kg)              We Performed the Following     CBC with platelets differential          Today's Medication Changes          These changes are accurate as of: 9/20/17 12:02 PM.  If you have any questions, ask your nurse or doctor.               Start taking these medicines.        Dose/Directions    doxycycline 100 MG tablet   Commonly known as:  VIBRA-TABS   Used for:  Pneumonia due to infectious organism, unspecified laterality, unspecified part of lung   Started by:  Yancy Herrera PA-C        Dose:  100 mg   Take 1 tablet (100 mg) by mouth 2 times daily   Quantity:  20 tablet   Refills:  0       guaiFENesin-codeine 100-10 MG/5ML Soln solution   Commonly known as:  ROBITUSSIN AC   Used for:  Pneumonia due to infectious organism, unspecified laterality, unspecified part of lung   Started by:  Yancy Herrera PA-C        Dose:  1 tsp.   Take 5 mLs by mouth every 8 hours as needed   Quantity:  120 mL   Refills:  0            Where to get your medicines      These medications were sent to [facility]  [address] - [id number]  [address] [place] [address]     Phone:  [phone]     doxycycline 100 MG tablet         Some of these will need a paper prescription and others can be bought over the counter.  Ask your nurse if you have questions.     Bring a paper prescription for each of these medications     guaiFENesin-codeine 100-10 MG/5ML Soln " solution                Primary Care Provider Office Phone # Fax #    Mera Gupta [phone] [address] [place] [address]        Equal Access to Services     STARSALUD CHANTELLE : Hadii aad ku hadmanavo Soomaali, waaxda luqadaha, qaybta kaalmada adeegyada, lopez yanesmarce perkins. So [facility] [phone].    ATENCIÓN: Si habla español, tiene a jaimes disposición servicios gratuitos de asistencia lingüística. Llame al [phone].    We comply with applicable federal civil rights laws and [place] laws. We do not discriminate on the basis of race, color, national origin, age, disability sex, sexual orientation or gender identity.            Thank you!     Thank you for choosing [facility]  for your care. Our goal is always to provide you with excellent care. Hearing back from our patients is one way we can continue to improve our services. Please take a few minutes to complete the written survey that you may receive in the mail after your visit with us. Thank you!             Your Updated Medication List - Protect others around you: Learn how to safely use, store and throw away your medicines at www.disposemymeds.org.          This list is accurate as of: 9/20/17 12:02 PM.  Always use your most recent med list.                   Brand Name Dispense Instructions for use Diagnosis    calcium + D 600-200 MG-UNIT Tabs   Generic drug:  calcium carbonate-vitamin D      Take  by mouth.        CENTRUM SILVER per tablet      Take 1 tablet by mouth daily.        COQ-10 PO      Take 1 tablet by mouth daily        doxycycline 100 MG tablet    VIBRA-TABS    20 tablet    Take 1 tablet (100 mg) by mouth 2 times daily    Pneumonia due to infectious organism, unspecified laterality, unspecified part of lung       ESTRACE VAGINAL 0.1 MG/GM cream   Generic drug:  estradiol      Place 1 g vaginally twice a week        guaiFENesin-codeine 100-10 MG/5ML Soln solution     ROBITUSSIN AC    120 mL    Take 5 mLs by mouth every 8 hours as needed    Pneumonia due to infectious organism, unspecified laterality, unspecified part of lung       hydrOXYzine 25 MG capsule    VISTARIL    20 capsule    Take 1 capsule (25 mg) by mouth 3 times daily as needed for itching or anxiety    Anxiety       PROBIOTIC DAILY PO      Take by mouth daily        sertraline 25 MG tablet    ZOLOFT    30 tablet    Take 0.5 tablets (12.5 mg) by mouth daily After 2-3 weeks may increase to 25 mg per day    Stress reaction       triamcinolone 0.1 % cream    KENALOG    30 g    Apply sparingly to affected area three times daily for 14 days.    Seborrheic dermatitis

## 2017-09-26 NOTE — ADDENDUM NOTE
Encounter addended by: Katarina Patterson PT on: 9/26/2017  5:30 PM<BR>     Actions taken: Flowsheet accepted

## 2017-10-10 ENCOUNTER — TELEPHONE (OUTPATIENT)
Dept: FAMILY MEDICINE | Facility: CLINIC | Age: 77
End: 2017-10-10

## 2017-10-10 NOTE — TELEPHONE ENCOUNTER
Patient is calling today with continuing fatigue after 9/20   Pneumonia diagnosis. She has had the cough for about one  Month now. She is just not feeling better yet, her cough is gone.  Should she be seen again? No fevers. She felt maybe she was given  The wrong antibiotic. Reassurance given about medication.     Huddled with Dr velasco, this can take 6 weeks before she will   Feel completely better. She should just rest and stay well hydrated,  And we could see her at the end of the month if she has not fully recovered.  If she has breathing issues or persistent cough, should be seen sooner.     Scheduled for physical later this month.    Madelin Parish, RN  Triage Nurse

## 2017-10-30 ENCOUNTER — OFFICE VISIT (OUTPATIENT)
Dept: FAMILY MEDICINE | Facility: CLINIC | Age: 77
End: 2017-10-30
Payer: COMMERCIAL

## 2017-10-30 VITALS
WEIGHT: 174.3 LBS | OXYGEN SATURATION: 97 % | HEART RATE: 81 BPM | BODY MASS INDEX: 27.36 KG/M2 | TEMPERATURE: 98 F | RESPIRATION RATE: 17 BRPM | DIASTOLIC BLOOD PRESSURE: 74 MMHG | HEIGHT: 67 IN | SYSTOLIC BLOOD PRESSURE: 126 MMHG

## 2017-10-30 DIAGNOSIS — Z13.6 CARDIOVASCULAR SCREENING; LDL GOAL LESS THAN 160: ICD-10-CM

## 2017-10-30 DIAGNOSIS — Z00.00 ROUTINE GENERAL MEDICAL EXAMINATION AT A HEALTH CARE FACILITY: Primary | ICD-10-CM

## 2017-10-30 DIAGNOSIS — Z12.31 VISIT FOR SCREENING MAMMOGRAM: ICD-10-CM

## 2017-10-30 DIAGNOSIS — G25.2 INTENTION TREMOR: ICD-10-CM

## 2017-10-30 DIAGNOSIS — Z23 NEED FOR PROPHYLACTIC VACCINATION AND INOCULATION AGAINST INFLUENZA: ICD-10-CM

## 2017-10-30 PROCEDURE — 99397 PER PM REEVAL EST PAT 65+ YR: CPT | Mod: 25 | Performed by: INTERNAL MEDICINE

## 2017-10-30 PROCEDURE — G0008 ADMIN INFLUENZA VIRUS VAC: HCPCS | Performed by: INTERNAL MEDICINE

## 2017-10-30 PROCEDURE — 99213 OFFICE O/P EST LOW 20 MIN: CPT | Mod: 25 | Performed by: INTERNAL MEDICINE

## 2017-10-30 PROCEDURE — 36415 COLL VENOUS BLD VENIPUNCTURE: CPT | Performed by: INTERNAL MEDICINE

## 2017-10-30 PROCEDURE — 80053 COMPREHEN METABOLIC PANEL: CPT | Performed by: INTERNAL MEDICINE

## 2017-10-30 PROCEDURE — 90662 IIV NO PRSV INCREASED AG IM: CPT | Performed by: INTERNAL MEDICINE

## 2017-10-30 PROCEDURE — 80061 LIPID PANEL: CPT | Performed by: INTERNAL MEDICINE

## 2017-10-30 RX ORDER — PROPRANOLOL HYDROCHLORIDE 20 MG/1
20 TABLET ORAL 2 TIMES DAILY
Qty: 60 TABLET | Refills: 3 | Status: SHIPPED | OUTPATIENT
Start: 2017-10-30 | End: 2018-06-27

## 2017-10-30 NOTE — PROGRESS NOTES
SUBJECTIVE:   Sommer Jaimes is a 76 year old female who presents for Preventive Visit.    Are you in the first 12 months of your Medicare coverage?  No    Physical   Annual:     Getting at least 3 servings of Calcium per day::  Yes    Bi-annual eye exam::  Yes    Dental care twice a year::  Yes    Sleep apnea or symptoms of sleep apnea::  None    Diet::  Regular (no restrictions)    Frequency of exercise::  4-5 days/week    Duration of exercise::  15-30 minutes    Taking medications regularly::  Yes    Medication side effects::  Not applicable    Additional concerns today::  YES    COGNITIVE SCREEN  1) Repeat 3 items (Banana, Sunrise, Chair)    2) Clock draw: NORMAL  3) 3 item recall: Recalls 3 objects  Results: 3 items recalled: COGNITIVE IMPAIRMENT LESS LIKELY    Mini-CogTM Copyright S Kole. Licensed by the author for use in [facility]; reprinted with permission (mila@[email or web address]). All rights reserved.      Reviewed and updated as needed this visit by clinical staffTobacco  Allergies  Meds  Med Hx  Surg Hx  Fam Hx  Soc Hx      Reviewed and updated as needed this visit by Provider        Social History   Substance Use Topics     Smoking status: Never Smoker     Smokeless tobacco: Never Used     Alcohol use 0.0 oz/week     0 Standard drinks or equivalent per week      Comment: very seldom     The patient does not drink >3 drinks per day nor >7 drinks per week.    Today's PHQ-2 Score:   PHQ-2 ( 1999 Pfizer) 10/30/2017   Q1: Little interest or pleasure in doing things 0   Q2: Feeling down, depressed or hopeless 0   PHQ-2 Score 0   Q1: Little interest or pleasure in doing things Not at all   Q2: Feeling down, depressed or hopeless Not at all   PHQ-2 Score 0     Do you feel safe in your environment - Yes    Do you have a Health Care Directive?: Yes: Patient states has Advance Directive and will bring in a copy to clinic.    Current providers sharing in care for this patient include:   Patient Care  Team:  Mera Gupta MD as PCP - General      Hearing impairment: No    Ability to successfully perform activities of daily living: Yes, no assistance needed     Fall risk:  Fallen 2 or more times in the past year?: No  Any fall with injury in the past year?: No      Home safety:  none identified    The following health maintenance items are reviewed in Epic and correct as of today:  Health Maintenance   Topic Date Due     DEXA Q3 YR  04/12/2016     INFLUENZA VACCINE (SYSTEM ASSIGNED)  09/01/2017     FALL RISK ASSESSMENT  10/19/2017     MAMMO Q1 YR  11/01/2017     LIPID SCREEN Q5 YR FEMALE (SYSTEM ASSIGNED)  10/19/2021     ADVANCE DIRECTIVE PLANNING Q5 YRS  10/30/2022     TETANUS IMMUNIZATION (SYSTEM ASSIGNED)  03/06/2023     COLONOSCOPY Q10 YR  04/19/2023     PNEUMOCOCCAL  Completed     Labs reviewed in [facility]    Mammogram Screening: Patient over age 75, has elected to continue with mammography screening.    Review of Systems  CONSTITUTIONAL: NEGATIVE for fever, chills, change in weight  INTEGUMENTARY/SKIN: NEGATIVE for worrisome rashes, moles or lesions  EYES: NEGATIVE for vision changes or irritation  ENT/MOUTH: NEGATIVE for ear, mouth and throat problems  RESP: NEGATIVE for significant cough or SOB  BREAST: NEGATIVE for masses, tenderness or discharge  CV: NEGATIVE for chest pain, palpitations or peripheral edema  GI: NEGATIVE for nausea, abdominal pain, heartburn, or change in bowel habits  : Hx of urethral fistula; Hx of hematuria  MUSCULOSKELETAL: NEGATIVE for significant arthralgias or myalgia  NEURO: POSITIVE for increased tremors, left hand > right hand;  NEGATIVE for weakness, dizziness or paresthesias  ENDOCRINE: Fasting for labs today; NEGATIVE for temperature intolerance, skin/hair changes  HEME/ALLERGY/IMMUNE: NEGATIVE for bleeding problems  PSYCHIATRIC: NEGATIVE for changes in mood or affect    This document serves as a record of the services and decisions personally performed and made by  "Mera Gupta MD. It was created on her behalf by Sharon Aguilar, a trained medical scribe. The creation of this document is based on the provider's statements to the medical scribe.   Sharon Aguilar, 9:19 AM, October 30, 2017    OBJECTIVE:   /74  Pulse 81  Temp 98  F (36.7  C) (Oral)  Resp 17  Ht 5' 6.5\" (1.689 m)  Wt 174 lb 4.8 oz (79.1 kg)  SpO2 97%  BMI 27.71 kg/m2 Estimated body mass index is 27.71 kg/(m^2) as calculated from the following:    Height as of this encounter: 5' 6.5\" (1.689 m).    Weight as of this encounter: 174 lb 4.8 oz (79.1 kg).     Physical Exam  GENERAL: healthy, alert and no distress  EYES: Eyes grossly normal to inspection and PERRL  HENT: normal cephalic/atraumatic, ear canals and TM's normal, nose and mouth without ulcers or lesions, oropharynx clear and oral mucous membranes moist  NECK: no adenopathy, no asymmetry, masses, or scars, thyroid normal to palpation and no carotid bruits  RESP: lungs clear to auscultation - no rales, rhonchi or wheezes  BREAST: Clinical breast exam today; normal without masses, tenderness or nipple discharge and no palpable axillary masses or adenopathy  CV: regular rates and rhythm, normal S1 S2, no murmur, peripheral pulses strong and no peripheral edema  ABDOMEN: soft, nontender, without hepatosplenomegaly or masses and bowel sounds normal  MS: extremities normal- no gross deformities noted  SKIN: no suspicious lesions or rashes  NEURO: bilateral intention tremor; increased with finger, nose finger; no resting tremor or pill rolling. Preserved strength and tone, sensory exam grossly normal and mentation intact  PSYCH: mentation appears normal and affect normal/bright    ASSESSMENT / PLAN:   (Z00.00) Routine general medical examination at a health care facility  (primary encounter diagnosis)  Comment: HEALTH CARE MAINTENANCE and immunizations reviewed and up to date; Fasting for labs today; Flu shot given today in clinic  Plan: Annual " "preventative visit    (Z13.6) CARDIOVASCULAR SCREENING; LDL GOAL LESS THAN 160  Comment: LDL at goal in past; fasting for labs today; will continue to monitor  Plan: Comprehensive metabolic panel, Lipid panel         reflex to direct LDL Fasting    (Z23) Need for prophylactic vaccination and inoculation against influenza  Comment: Influenza vaccine given today in clinic  Plan: FLU VACCINE, INCREASED ANTIGEN, PRESV FREE, AGE        65+ [05018], ADMIN INFLUENZA (For MEDICARE         Patients ONLY) []    (Z12.31) Visit for screening mammogram  Comment: Mammo Q1 year; completed last 11/1/2016; due 11/1/2017  Plan: MA Screening Digital Bilateral    (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  pt desires a trial of Propranolol; will continue to monitor; monitor BLOOD PRESSURE on BB.  Plan: propranolol (INDERAL) 20 MG tablet      End of Life Planning:  Patient currently has an advanced directive: Yes.  Practitioner is supportive of decision.    COUNSELING:  Reviewed preventive health counseling, as reflected in patient instructions  Special attention given to:       Regular exercise       Healthy diet/nutrition    BP Screening:   Last 3 BP Readings:    BP Readings from Last 3 Encounters:   10/30/17 126/74   09/20/17 116/78   05/10/17 130/64     The following was recommended to the patient:  Re-screen BP within a year and recommended lifestyle modifications    Estimated body mass index is 27.71 kg/(m^2) as calculated from the following:    Height as of this encounter: 5' 6.5\" (1.689 m).    Weight as of this encounter: 174 lb 4.8 oz (79.1 kg).     reports that she has never smoked. She has never used smokeless tobacco.    Appropriate preventive services were discussed with this patient, including applicable screening as appropriate for cardiovascular disease, diabetes, osteopenia/osteoporosis, and glaucoma.  As appropriate " for age/gender, discussed screening for colorectal cancer, prostate cancer, breast cancer, and cervical cancer. Checklist reviewing preventive services available has been given to the patient.    Reviewed patients plan of care and provided an AVS. The Basic Care Plan (routine screening as documented in Health Maintenance) for Sommer meets the Care Plan requirement. This Care Plan has been established and reviewed with the Patient.    Counseling Resources:  ATP IV Guidelines  Pooled Cohorts Equation Calculator  Breast Cancer Risk Calculator  FRAX Risk Assessment  ICSI Preventive Guidelines  Dietary Guidelines for Americans, 2010  [employer]'s MyPlate  ASA Prophylaxis  Lung CA Screening    Mera Gupta MD  Internal Medicine  [facility] [place]  15 minutes in addition to HEALTH CARE MAINTENANCE are spent with patient evaluating a new medical concern; over 50% of that time spent providing counselling, discussing and reviewing medical conditions/concerns, meds and potential side effects.     The information in this document, created by a medical scribe for me, accurately reflects the services I personally performed and the decisions made by me. I have reviewed and approved this document for accuracy.  Dr. Mera Gupta, 9:35 AM, October 30, 2017    Answers for HPI/ROS submitted by the patient on 10/30/2017   PHQ-2 Score: 0    Injectable Influenza Immunization Documentation    1.  Is the person to be vaccinated sick today?   No    2. Does the person to be vaccinated have an allergy to a component   of the vaccine?   No  Egg Allergy Algorithm Link    3. Has the person to be vaccinated ever had a serious reaction   to influenza vaccine in the past?   No    4. Has the person to be vaccinated ever had Guillain-Barré syndrome?   No    Form completed by patient

## 2017-10-30 NOTE — MR AVS SNAPSHOT
After Visit Summary   10/30/2017    Sommer Jaimes    MRN: [id number]           Patient Information     Date Of Birth          1940        Visit Information        Provider Department      10/30/2017 9:00 AM Mera Gupta MD [facility]        Today's Diagnoses     Routine general medical examination at a health care facility    -  1    CARDIOVASCULAR SCREENING; LDL GOAL LESS THAN 160        Need for prophylactic vaccination and inoculation against influenza        Visit for screening mammogram        Intention tremor          Care Instructions      Preventive Health Recommendations    Female Ages 65 +    Yearly exam:     See your health care provider every year in order to  o Review health changes.   o Discuss preventive care.    o Review your medicines if your doctor has prescribed any.      You no longer need a yearly Pap test unless you've had an abnormal Pap test in the past 10 years. If you have vaginal symptoms, such as bleeding or discharge, be sure to talk with your provider about a Pap test.      Every 1 to 2 years, have a mammogram.  If you are over 69, talk with your health care provider about whether or not you want to continue having screening mammograms.      Every 10 years, have a colonoscopy. Or, have a yearly FIT test (stool test). These exams will check for colon cancer.       Have a cholesterol test every 5 years, or more often if your doctor advises it.       Have a diabetes test (fasting glucose) every three years. If you are at risk for diabetes, you should have this test more often.       At age 65, have a bone density scan (DEXA) to check for osteoporosis (brittle bone disease).    Shots:    Get a flu shot each year.    Get a tetanus shot every 10 years.    Talk to your doctor about your pneumonia vaccines. There are now two you should receive - Pneumovax (PPSV 23) and Prevnar (PCV 13).    Talk to your doctor about the shingles vaccine.    Talk  "to your doctor about the hepatitis B vaccine.    Nutrition:     Eat at least 5 servings of fruits and vegetables each day.      Eat whole-grain bread, whole-wheat pasta and brown rice instead of white grains and rice.      Talk to your provider about Calcium and Vitamin D.     Lifestyle    Exercise at least 150 minutes a week (30 minutes a day, 5 days a week). This will help you control your weight and prevent disease.      Limit alcohol to one drink per day.      No smoking.       Wear sunscreen to prevent skin cancer.       See your dentist twice a year for an exam and cleaning.      See your eye doctor every 1 to 2 years to screen for conditions such as glaucoma, macular degeneration and cataracts.          Follow-ups after your visit        Future tests that were ordered for you today     Open Future Orders        Priority Expected Expires Ordered    MA Screening Digital Bilateral Routine  10/30/2018 10/30/2017            Who to contact     If you have questions or need follow up information about today's clinic visit or your schedule please contact [facility] directly at [phone].  Normal or non-critical lab and imaging results will be communicated to you by AudioTaghart, letter or phone within 4 business days after the clinic has received the results. If you do not hear from us within 7 days, please contact the clinic through HiPer [employer] or phone. If you have a critical or abnormal lab result, we will notify you by phone as soon as possible.  Submit refill requests through Pressly or call your pharmacy and they will forward the refill request to us. Please allow 3 business days for your refill to be completed.          Additional Information About Your Visit        Pressly Information     Pressly lets you send messages to your doctor, view your test results, renew your prescriptions, schedule appointments and more. To sign up, go to [email or web address] . Click on \"Log in\" on the left side of " "the screen, which will take you to the Welcome page. Then click on \"Sign up Now\" on the right side of the page.     You will be asked to enter the access code listed below, as well as some personal information. Please follow the directions to create your username and password.     Your access code is: [email or web address]  Expires: 2017 12:02 PM     Your access code will  in 90 days. If you need help or a new code, please call your [facility] clinic or [phone].        Care EveryWhere ID     This is your Care EveryWhere ID. This could be used by other organizations to access your [facility] medical records  [id number]        Your Vitals Were     Pulse Temperature Respirations Height Pulse Oximetry BMI (Body Mass Index)    81 98  F (36.7  C) (Oral) 17 5' 6.5\" (1.689 m) 97% 27.71 kg/m2       Blood Pressure from Last 3 Encounters:   10/30/17 126/74   17 116/78   05/10/17 130/64    Weight from Last 3 Encounters:   10/30/17 174 lb 4.8 oz (79.1 kg)   17 170 lb (77.1 kg)   10/19/16 179 lb 9.6 oz (81.5 kg)              We Performed the Following     ADMIN INFLUENZA (For MEDICARE Patients ONLY) []     Comprehensive metabolic panel     FLU VACCINE, INCREASED ANTIGEN, PRESV FREE, AGE 65+ [31414]     Lipid panel reflex to direct LDL Fasting          Today's Medication Changes          These changes are accurate as of: 10/30/17  9:31 AM.  If you have any questions, ask your nurse or doctor.               Start taking these medicines.        Dose/Directions    propranolol 20 MG tablet   Commonly known as:  INDERAL   Used for:  Intention tremor   Started by:  Mera Gupta MD        Dose:  20 mg   Take 1 tablet (20 mg) by mouth 2 times daily   Quantity:  60 tablet   Refills:  3            Where to get your medicines      These medications were sent to [facility] [place] - [address]  [address] [place] [address]     Phone:  [phone]     propranolol 20 MG " tablet                Primary Care Provider Office Phone # Fax #    Mera Gupta [phone] [address] [address] [facility] [address]        Equal Access to Services     ALYSSIA LOCKHART : Hadii jorge alberto ku hadmanavo Soomaali, waaxda luqadaha, qaybta kaalmada adeegyada, lopez yanesmarce perkins. So [facility] [phone].    ATENCIÓN: Si habla español, tiene a jaimes disposición servicios gratuitos de asistencia lingüística. Llame al [phone].    We comply with applicable federal civil rights laws and [place] laws. We do not discriminate on the basis of race, color, national origin, age, disability, sex, sexual orientation, or gender identity.            Thank you!     Thank you for choosing [facility]  for your care. Our goal is always to provide you with excellent care. Hearing back from our patients is one way we can continue to improve our services. Please take a few minutes to complete the written survey that you may receive in the mail after your visit with us. Thank you!             Your Updated Medication List - Protect others around you: Learn how to safely use, store and throw away your medicines at www.disposemymeds.org.          This list is accurate as of: 10/30/17  9:31 AM.  Always use your most recent med list.                   Brand Name Dispense Instructions for use Diagnosis    calcium + D 600-200 MG-UNIT Tabs   Generic drug:  calcium carbonate-vitamin D      Take  by mouth.        CENTRUM SILVER per tablet      Take 1 tablet by mouth daily.        ESTRACE VAGINAL 0.1 MG/GM cream   Generic drug:  estradiol      Place 1 g vaginally twice a week        propranolol 20 MG tablet    INDERAL    60 tablet    Take 1 tablet (20 mg) by mouth 2 times daily    Intention tremor

## 2017-10-30 NOTE — NURSING NOTE
"Chief Complaint   Patient presents with     Physical       Initial /74  Pulse 81  Temp 98  F (36.7  C) (Oral)  Resp 17  Ht 5' 6.5\" (1.689 m)  Wt 174 lb 4.8 oz (79.1 kg)  SpO2 97%  BMI 27.71 kg/m2 Estimated body mass index is 27.71 kg/(m^2) as calculated from the following:    Height as of this encounter: 5' 6.5\" (1.689 m).    Weight as of this encounter: 174 lb 4.8 oz (79.1 kg).  Medication Reconciliation: complete    "

## 2017-10-31 LAB
ALBUMIN SERPL-MCNC: 4.1 G/DL (ref 3.4–5)
ALP SERPL-CCNC: 93 U/L (ref 40–150)
ALT SERPL W P-5'-P-CCNC: 51 U/L (ref 0–50)
ANION GAP SERPL CALCULATED.3IONS-SCNC: 9 MMOL/L (ref 3–14)
AST SERPL W P-5'-P-CCNC: 35 U/L (ref 0–45)
BILIRUB SERPL-MCNC: 0.7 MG/DL (ref 0.2–1.3)
BUN SERPL-MCNC: 19 MG/DL (ref 7–30)
CALCIUM SERPL-MCNC: 9.3 MG/DL (ref 8.5–10.1)
CHLORIDE SERPL-SCNC: 107 MMOL/L (ref 94–109)
CHOLEST SERPL-MCNC: 212 MG/DL
CO2 SERPL-SCNC: 25 MMOL/L (ref 20–32)
CREAT SERPL-MCNC: 0.91 MG/DL (ref 0.52–1.04)
GFR SERPL CREATININE-BSD FRML MDRD: 60 ML/MIN/1.7M2
GLUCOSE SERPL-MCNC: 98 MG/DL (ref 70–99)
HDLC SERPL-MCNC: 75 MG/DL
LDLC SERPL CALC-MCNC: 102 MG/DL
NONHDLC SERPL-MCNC: 137 MG/DL
POTASSIUM SERPL-SCNC: 4.3 MMOL/L (ref 3.4–5.3)
PROT SERPL-MCNC: 7.9 G/DL (ref 6.8–8.8)
SODIUM SERPL-SCNC: 141 MMOL/L (ref 133–144)
TRIGL SERPL-MCNC: 176 MG/DL

## 2017-11-07 ENCOUNTER — HOSPITAL ENCOUNTER (OUTPATIENT)
Dept: MAMMOGRAPHY | Facility: CLINIC | Age: 77
Discharge: HOME | End: 2017-11-07
Attending: INTERNAL MEDICINE | Admitting: INTERNAL MEDICINE
Payer: COMMERCIAL

## 2017-11-07 DIAGNOSIS — Z12.31 VISIT FOR SCREENING MAMMOGRAM: ICD-10-CM

## 2017-11-07 PROCEDURE — G0202 SCR MAMMO BI INCL CAD: HCPCS

## 2018-04-20 ENCOUNTER — OFFICE VISIT (OUTPATIENT)
Dept: FAMILY MEDICINE | Facility: CLINIC | Age: 78
End: 2018-04-20
Payer: COMMERCIAL

## 2018-04-20 VITALS
OXYGEN SATURATION: 96 % | HEART RATE: 79 BPM | TEMPERATURE: 99.5 F | DIASTOLIC BLOOD PRESSURE: 62 MMHG | WEIGHT: 175 LBS | SYSTOLIC BLOOD PRESSURE: 124 MMHG | BODY MASS INDEX: 27.82 KG/M2

## 2018-04-20 DIAGNOSIS — R82.90 NONSPECIFIC FINDING ON EXAMINATION OF URINE: ICD-10-CM

## 2018-04-20 DIAGNOSIS — N30.00 ACUTE CYSTITIS WITHOUT HEMATURIA: Primary | ICD-10-CM

## 2018-04-20 LAB
ALBUMIN UR-MCNC: 30 MG/DL
APPEARANCE UR: ABNORMAL
BACTERIA #/AREA URNS HPF: ABNORMAL /HPF
BILIRUB UR QL STRIP: NEGATIVE
COLOR UR AUTO: YELLOW
GLUCOSE UR STRIP-MCNC: NEGATIVE MG/DL
HGB UR QL STRIP: ABNORMAL
KETONES UR STRIP-MCNC: NEGATIVE MG/DL
LEUKOCYTE ESTERASE UR QL STRIP: ABNORMAL
MUCOUS THREADS #/AREA URNS LPF: PRESENT /LPF
NITRATE UR QL: NEGATIVE
NON-SQ EPI CELLS #/AREA URNS LPF: ABNORMAL /LPF
PH UR STRIP: 6 PH (ref 5–7)
RBC #/AREA URNS AUTO: ABNORMAL /HPF
SOURCE: ABNORMAL
SP GR UR STRIP: 1.01 (ref 1–1.03)
UROBILINOGEN UR STRIP-ACNC: 0.2 EU/DL (ref 0.2–1)
WBC #/AREA URNS AUTO: ABNORMAL /HPF

## 2018-04-20 PROCEDURE — 87086 URINE CULTURE/COLONY COUNT: CPT | Performed by: PHYSICIAN ASSISTANT

## 2018-04-20 PROCEDURE — 99213 OFFICE O/P EST LOW 20 MIN: CPT | Performed by: PHYSICIAN ASSISTANT

## 2018-04-20 PROCEDURE — 81001 URINALYSIS AUTO W/SCOPE: CPT | Performed by: PHYSICIAN ASSISTANT

## 2018-04-20 RX ORDER — SULFAMETHOXAZOLE/TRIMETHOPRIM 800-160 MG
1 TABLET ORAL 2 TIMES DAILY
Qty: 6 TABLET | Refills: 0 | Status: SHIPPED | OUTPATIENT
Start: 2018-04-20 | End: 2018-04-23

## 2018-04-20 NOTE — MR AVS SNAPSHOT
"              After Visit Summary   4/20/2018    Sommer Jaimes    MRN: [id number]           Patient Information     Date Of Birth          1940        Visit Information        Provider Department      4/20/2018 2:10 PM Yancy Herrera PA-C [facility]        Today's Diagnoses     Acute cystitis without hematuria    -  1    Nonspecific finding on examination of urine           Follow-ups after your visit        Follow-up notes from your care team     Return in about 1 week (around 4/27/2018) for If symptoms worsen or fail to improve.      Your next 10 appointments already scheduled     Apr 20, 2018  2:10 PM CDT   SHORT with Yancy Herrera PA-C   [facility]    [address] [facility] [address] [phone]              Who to contact     If you have questions or need follow up information about today's clinic visit or your schedule please contact [facility] directly at [phone].  Normal or non-critical lab and imaging results will be communicated to you by MyChart, letter or phone within 4 business days after the clinic has received the results. If you do not hear from us within 7 days, please contact the clinic through Illumix Softwarehart or phone. If you have a critical or abnormal lab result, we will notify you by phone as soon as possible.  Submit refill requests through [employer] or call your pharmacy and they will forward the refill request to us. Please allow 3 business days for your refill to be completed.          Additional Information About Your Visit        MyChart Information     [employer] lets you send messages to your doctor, view your test results, renew your prescriptions, schedule appointments and more. To sign up, go to [email or web address] . Click on \"Log in\" on the left side of the screen, which will take you to the Welcome page. Then click on \"Sign up Now\" on the right side of the " page.     You will be asked to enter the access code listed below, as well as some personal information. Please follow the directions to create your username and password.     Your access code is: [email or web address]  Expires: 2018  2:09 PM     Your access code will  in 90 days. If you need help or a new code, please call your [facility] clinic or [phone].        Care EveryWhere ID     This is your Care EveryWhere ID. This could be used by other organizations to access your [facility] medical records  [id number]        Your Vitals Were     Pulse Temperature Pulse Oximetry BMI (Body Mass Index)          79 99.5  F (37.5  C) (Oral) 96% 27.82 kg/m2         Blood Pressure from Last 3 Encounters:   18 124/62   10/30/17 126/74   17 116/78    Weight from Last 3 Encounters:   18 175 lb (79.4 kg)   10/30/17 174 lb 4.8 oz (79.1 kg)   17 170 lb (77.1 kg)              We Performed the Following     **UA reflex to Microscopic FUTURE anytime     Urine Culture Aerobic Bacterial     Urine Microscopic          Today's Medication Changes          These changes are accurate as of 18  2:09 PM.  If you have any questions, ask your nurse or doctor.               Start taking these medicines.        Dose/Directions    sulfamethoxazole-trimethoprim 800-160 MG per tablet   Commonly known as:  BACTRIM DS/SEPTRA DS   Used for:  Acute cystitis without hematuria   Started by:  Yancy Herrera PA-C        Dose:  1 tablet   Take 1 tablet by mouth 2 times daily for 3 days   Quantity:  6 tablet   Refills:  0            Where to get your medicines      These medications were sent to [facility] [place] - [address]  [address] [place] [address]     Phone:  [phone]     sulfamethoxazole-trimethoprim 800-160 MG per tablet                Primary Care Provider Office Phone # Fax #    Mera Gupta [phone] [address]  [facility] [address]        Equal Access to Services     [facility] : Hadii jorge alberto matthews tanika Sokarly, waaxda luqadaha, qaybta kabrandongalen woods, lopez lópezfanibreanne perkins. So [facility] [phone].    ATENCIÓN: Si habla español, tiene a jaimes disposición servicios gratuitos de asistencia lingüística. [facility] [phone].    We comply with applicable federal civil rights laws and [place] laws. We do not discriminate on the basis of race, color, national origin, age, disability, sex, sexual orientation, or gender identity.            Thank you!     Thank you for choosing [facility]  for your care. Our goal is always to provide you with excellent care. Hearing back from our patients is one way we can continue to improve our services. Please take a few minutes to complete the written survey that you may receive in the mail after your visit with us. Thank you!             Your Updated Medication List - Protect others around you: Learn how to safely use, store and throw away your medicines at www.disposemymeds.org.          This list is accurate as of 4/20/18  2:09 PM.  Always use your most recent med list.                   Brand Name Dispense Instructions for use Diagnosis    calcium + D 600-200 MG-UNIT Tabs   Generic drug:  calcium carbonate-vitamin D      Take  by mouth.        CENTRUM SILVER per tablet      Take 1 tablet by mouth daily.        ESTRACE VAGINAL 0.1 MG/GM cream   Generic drug:  estradiol      Place 1 g vaginally twice a week        propranolol 20 MG tablet    INDERAL    60 tablet    Take 1 tablet (20 mg) by mouth 2 times daily    Intention tremor       sulfamethoxazole-trimethoprim 800-160 MG per tablet    BACTRIM DS/SEPTRA DS    6 tablet    Take 1 tablet by mouth 2 times daily for 3 days    Acute cystitis without hematuria

## 2018-04-20 NOTE — PROGRESS NOTES
SUBJECTIVE:   Sommer Jaimes is a 77 year old female who presents to clinic today for the following health issues:      URINARY TRACT SYMPTOMS      Duration: for the past 24 hours    Description  urgency and retention    Intensity:  moderate    Accompanying signs and symptoms:  Fever/chills: YES  Flank pain no   Nausea and vomiting: YES- nauseated  Vaginal symptoms: none  Abdominal/Pelvic Pain: YES- slight pressure    History  History of frequent UTI's: YES  History of kidney stones: no   Sexually Active: no   Possibility of pregnancy: No    Precipitating or alleviating factors: None    Therapies tried and outcome: Had macrodantin at home so started last night   Outcome: unsure if it is helping       Patient is here today complaining of urinary urgency and retention  Ongoing for 1 day  Feels chilled but no known temp  + nausea, no vomiting, + lower pelvic pressure  Took 1 dose of Macrodantin last night without relief  Is also pushing fluids  Recently had pessary taken out    Problem list and histories reviewed & adjusted, as indicated.  Additional history: as documented    Patient Active Problem List   Diagnosis     Symptomatic menopausal or female climacteric states     Urethral fistula     Hematuria     CARDIOVASCULAR SCREENING; LDL GOAL LESS THAN 160     HYPERLIPIDEMIA LDL GOAL <160     Advanced directives, counseling/discussion     Anxiety     Past Surgical History:   Procedure Laterality Date     C APPENDECTOMY  1973    Appendectomy.     C NONSPECIFIC PROCEDURE      Sebaceous cyst.     C NONSPECIFIC PROCEDURE      Ganglion cyst.     C NONSPECIFIC PROCEDURE      Oral surg./Plastic surg. - chin.     COLONOSCOPY  4/19/2013    colonoscopy     COLONOSCOPY  4/19/2013    Procedure: COLONOSCOPY;  Colonoscopy ;  Surgeon: Harman Becerra MD;  Location:  GI     HC COLONOSCOPY THRU STOMA, DIAGNOSTIC  4/02    Dr. Tavarez, normal no polyps     HC FLEX SIGMOIDOSCOPY W/WO QUYNH SPEC BY BRUSH/WASH  1993    Flex sig. and   colonoscopy 2002     HC REMOVAL GALLBLADDER  1973    Cholecystectomy.     TONSILLECTOMY & ADENOIDECTOMY      T&A age 12       Social History   Substance Use Topics     Smoking status: Never Smoker     Smokeless tobacco: Never Used     Alcohol use 0.0 oz/week     0 Standard drinks or equivalent per week      Comment: very seldom     Family History   Problem Relation Age of Onset     DIABETES Mother      HEART DISEASE Mother      Cancer - colorectal Father      Colon Cancer late 70's     Circulatory Sister      CHF     HEART DISEASE Sister      defib placed     Neurologic Disorder Son      Multiple sclerosis     CEREBROVASCULAR DISEASE Sister      Cancer - colorectal Other      niece in her 50's          Current Outpatient Prescriptions   Medication Sig Dispense Refill     calcium carbonate-vitamin D (CALCIUM + D) 600-200 MG-UNIT TABS Take  by mouth.       ESTRACE VAGINAL 0.1 MG/GM cream Place 1 g vaginally twice a week       Multiple Vitamins-Minerals (CENTRUM SILVER) per tablet Take 1 tablet by mouth daily.       propranolol (INDERAL) 20 MG tablet Take 1 tablet (20 mg) by mouth 2 times daily 60 tablet 3     sulfamethoxazole-trimethoprim (BACTRIM DS/SEPTRA DS) 800-160 MG per tablet Take 1 tablet by mouth 2 times daily for 3 days 6 tablet 0     Allergies   Allergen Reactions     Erythromycin Anaphylaxis     severe reaction, rash and throat swelling.        Reviewed and updated as needed this visit by clinical staff  Tobacco  Allergies  Med Hx  Surg Hx  Fam Hx  Soc Hx      Reviewed and updated as needed this visit by Provider         ROS:  Constitutional, HEENT, cardiovascular, pulmonary, gi and gu systems are negative, except as otherwise noted.    OBJECTIVE:     /62  Pulse 79  Temp 99.5  F (37.5  C) (Oral)  Wt 175 lb (79.4 kg)  SpO2 96%  BMI 27.82 kg/m2  Body mass index is 27.82 kg/(m^2).  GENERAL: healthy, alert and no distress  NECK: no adenopathy, no asymmetry, masses, or scars and thyroid normal  to palpation  RESP: lungs clear to auscultation - no rales, rhonchi or wheezes  CV: regular rate and rhythm, normal S1 S2, no S3 or S4, no murmur, click or rub, no peripheral edema and peripheral pulses strong  ABDOMEN: soft, nontender, no hepatosplenomegaly, no masses and bowel sounds normal  MS: no gross musculoskeletal defects noted, no edema    Diagnostic Test Results:  Results for orders placed or performed in visit on 04/20/18 (from the past 24 hour(s))   **UA reflex to Microscopic FUTURE anytime   Result Value Ref Range    Color Urine Yellow     Appearance Urine Cloudy     Glucose Urine Negative NEG^Negative mg/dL    Bilirubin Urine Negative NEG^Negative    Ketones Urine Negative NEG^Negative mg/dL    Specific Gravity Urine 1.015 1.003 - 1.035    Blood Urine Large (A) NEG^Negative    pH Urine 6.0 5.0 - 7.0 pH    Protein Albumin Urine 30 (A) NEG^Negative mg/dL    Urobilinogen Urine 0.2 0.2 - 1.0 EU/dL    Nitrite Urine Negative NEG^Negative    Leukocyte Esterase Urine Large (A) NEG^Negative    Source Midstream Urine    Urine Microscopic   Result Value Ref Range    WBC Urine  (A) OTO5^0 - 5 /HPF    RBC Urine 10-25 (A) OTO2^O - 2 /HPF    Squamous Epithelial /LPF Urine Moderate (A) FEW^Few /LPF    Bacteria Urine Many (A) NEG^Negative /HPF    Mucous Urine Present (A) NEG^Negative /LPF       ASSESSMENT/PLAN:             1. Acute cystitis without hematuria  - **UA reflex to Microscopic FUTURE anytime; Future  - **UA reflex to Microscopic FUTURE anytime  - Urine Microscopic  - sulfamethoxazole-trimethoprim (BACTRIM DS/SEPTRA DS) 800-160 MG per tablet; Take 1 tablet by mouth 2 times daily for 3 days  Dispense: 6 tablet; Refill: 0    2. Nonspecific finding on examination of urine  - Urine Culture Aerobic Bacterial    New problem, UA did reveal infection.  Recommend treatment with Bactrim BID x 3 days, push fluids.  F/U if symptoms worsen or do not improve.    Risks, benefits and alternatives were discussed with  patient. Agreeable to the plan of care.      Yancy Herrera PA-C  [facility]

## 2018-04-21 LAB
BACTERIA SPEC CULT: NORMAL
SPECIMEN SOURCE: NORMAL

## 2018-06-27 ENCOUNTER — TELEPHONE (OUTPATIENT)
Dept: FAMILY MEDICINE | Facility: CLINIC | Age: 78
End: 2018-06-27

## 2018-06-27 DIAGNOSIS — G25.2 INTENTION TREMOR: ICD-10-CM

## 2018-06-27 RX ORDER — PROPRANOLOL HYDROCHLORIDE 20 MG/1
20 TABLET ORAL 2 TIMES DAILY
Qty: 60 TABLET | Refills: 0 | Status: SHIPPED | OUTPATIENT
Start: 2018-06-27 | End: 2018-07-16 | Stop reason: ALTCHOICE

## 2018-06-27 NOTE — TELEPHONE ENCOUNTER
Patient calling because she is on propranolol, 20 mg, for intention tremors. Patient does not see a lot of difference and wondering if med should be increased or changed. Tremors are not worse, but not better either. Patient notes Dr. Gupta was starting her on low dose to start...    Appt scheduled for July 10th.     Huddled with Dr. Gupta, approved 30-day refill of propranolol, same dose, until she can be seen at clinic visit.    Prescription sent.    Best number to call: [phone]. Can leave detailed message.

## 2018-07-16 ENCOUNTER — OFFICE VISIT (OUTPATIENT)
Dept: FAMILY MEDICINE | Facility: CLINIC | Age: 78
End: 2018-07-16
Payer: COMMERCIAL

## 2018-07-16 VITALS
BODY MASS INDEX: 27.66 KG/M2 | OXYGEN SATURATION: 96 % | HEART RATE: 62 BPM | TEMPERATURE: 97.9 F | RESPIRATION RATE: 17 BRPM | WEIGHT: 174 LBS | SYSTOLIC BLOOD PRESSURE: 136 MMHG | DIASTOLIC BLOOD PRESSURE: 70 MMHG

## 2018-07-16 DIAGNOSIS — G25.2 INTENTION TREMOR: ICD-10-CM

## 2018-07-16 DIAGNOSIS — E78.5 HYPERLIPIDEMIA LDL GOAL <160: Primary | ICD-10-CM

## 2018-07-16 DIAGNOSIS — F41.9 ANXIETY: ICD-10-CM

## 2018-07-16 PROCEDURE — 99214 OFFICE O/P EST MOD 30 MIN: CPT | Performed by: INTERNAL MEDICINE

## 2018-07-16 RX ORDER — PROPRANOLOL HCL 60 MG
60 CAPSULE, EXTENDED RELEASE 24HR ORAL DAILY
Qty: 90 CAPSULE | Refills: 1 | Status: SHIPPED | OUTPATIENT
Start: 2018-07-16 | End: 2018-11-29

## 2018-07-16 RX ORDER — PROPRANOLOL HYDROCHLORIDE 20 MG/1
20 TABLET ORAL 2 TIMES DAILY
Qty: 180 TABLET | Refills: 3 | Status: CANCELLED | OUTPATIENT
Start: 2018-07-16

## 2018-07-16 NOTE — PROGRESS NOTES
SUBJECTIVE:   Sommer Jaimes is a 77 year old female who presents to clinic today for the following health issues:      Patient presents with:  Recheck Medication: follow up on Inderal and needs an Rx.   still has some slight concerns with tremors but overall is doing very good     Wondering if she needs to change the dose of her medication?      Amount of exercise or physical activity: 2-3 days/week for an average of 30-45 minutes    Problems taking medications regularly: No    Medication side effects: none    Diet: regular (no restrictions)      Problem list and histories reviewed & adjusted, as indicated.  Additional history: as documented    Patient Active Problem List   Diagnosis     Symptomatic menopausal or female climacteric states     Urethral fistula     Hematuria     CARDIOVASCULAR SCREENING; LDL GOAL LESS THAN 160     HYPERLIPIDEMIA LDL GOAL <160     Advanced directives, counseling/discussion     Anxiety     Past Surgical History:   Procedure Laterality Date     C APPENDECTOMY  1973    Appendectomy.     C NONSPECIFIC PROCEDURE      Sebaceous cyst.     C NONSPECIFIC PROCEDURE      Ganglion cyst.     C NONSPECIFIC PROCEDURE      Oral surg./Plastic surg. - chin.     COLONOSCOPY  4/19/2013    colonoscopy     COLONOSCOPY  4/19/2013    Procedure: COLONOSCOPY;  Colonoscopy ;  Surgeon: Harman Becerra MD;  Location:  GI     HC COLONOSCOPY THRU STOMA, DIAGNOSTIC  4/02    Dr. Tavarez, normal no polyps     HC FLEX SIGMOIDOSCOPY W/WO QUYNH SPEC BY BRUSH/WASH  1993    Flex sig. and  colonoscopy 2002     HC REMOVAL GALLBLADDER  1973    Cholecystectomy.     TONSILLECTOMY & ADENOIDECTOMY      T&A age 12       Social History   Substance Use Topics     Smoking status: Never Smoker     Smokeless tobacco: Never Used     Alcohol use 0.0 oz/week     0 Standard drinks or equivalent per week      Comment: very seldom     Family History   Problem Relation Age of Onset     Diabetes Mother      HEART DISEASE Mother       Cancer - colorectal Father      Colon Cancer late 70's     Circulatory Sister      CHF     HEART DISEASE Sister      defib placed     Neurologic Disorder Son      Multiple sclerosis     Cerebrovascular Disease Sister      Cancer - colorectal Other      niece in her 50's          Current Outpatient Prescriptions   Medication Sig Dispense Refill     calcium carbonate-vitamin D (CALCIUM + D) 600-200 MG-UNIT TABS Take  by mouth.       Multiple Vitamins-Minerals (CENTRUM SILVER) per tablet Take 1 tablet by mouth daily.       propranolol (INDERAL LA) 60 MG 24 hr capsule Take 1 capsule (60 mg) by mouth daily 90 capsule 1     Allergies   Allergen Reactions     Erythromycin Anaphylaxis     severe reaction, rash and throat swelling.      BP Readings from Last 3 Encounters:   07/16/18 136/70   04/20/18 124/62   10/30/17 126/74    Wt Readings from Last 3 Encounters:   07/16/18 174 lb (78.9 kg)   04/20/18 175 lb (79.4 kg)   10/30/17 174 lb 4.8 oz (79.1 kg)                  Labs reviewed in EPIC    Reviewed and updated as needed this visit by clinical staff  Tobacco  Allergies  Meds  Problems  Med Hx  Surg Hx  Fam Hx  Soc Hx        Reviewed and updated as needed this visit by Provider  Allergies  Meds  Problems         ROS:  CONSTITUTIONAL: NEGATIVE for fever, chills, change in weight  ENT/MOUTH: NEGATIVE for ear, mouth and throat problems  RESP: NEGATIVE for significant cough or SOB  CV: NEGATIVE for chest pain, palpitations or peripheral edema  GI: NEGATIVE for nausea, abdominal pain, heartburn, or change in bowel habits  MUSCULOSKELETAL: NEGATIVE for significant arthralgias or myalgia  NEURO: tremor less with meds but still present at times.- writing, eating, etc  ENDOCRINE: lipids- treated with diet and exercise;  Lab Results   Component Value Date     10/30/2017       PSYCHIATRIC: anxious; BB helpful as well.    OBJECTIVE:     /70  Pulse 62  Temp 97.9  F (36.6  C) (Oral)  Resp 17  Wt 174 lb (78.9  kg)  SpO2 96%  BMI 27.66 kg/m2  Body mass index is 27.66 kg/(m^2).  GENERAL: healthy, alert and no distress  NECK: no adenopathy, no asymmetry, masses, or scars and thyroid normal to palpation  RESP: lungs clear to auscultation - no rales, rhonchi or wheezes  CV: regular rates and rhythm, normal S1 S2, no S3 or S4, peripheral pulses strong and no peripheral edema  ABDOMEN: soft, nontender, no hepatosplenomegaly, no masses and bowel sounds normal  MS: no gross musculoskeletal defects noted, no edema  NEURO: Normal strength and tone, sensory exam grossly normal, mentation intact and FNF intact; intention tremor noted; no resting tremor.  PSYCH: mentation appears normal and affect normal/bright      ASSESSMENT/PLAN:     (G25.2) Intention tremor  Comment: l>R; intention tremor;  Propranolol started Fall 2017; writing OK, no Parkinson's; gait OK  Plan: propranolol (INDERAL LA) 60 MG 24 hr capsule          (E78.5) Hyperlipidemia LDL goal <160   Comment: diet and exercise; no med use  Plan: continue with healthy diet and regular eercise.    (F41.9) Anxiety  Comment: BB helping both anxiety and tremor.  Plan: continue same meds      Mera Gupta MD  Internal Medicine   [facility]

## 2018-07-16 NOTE — MR AVS SNAPSHOT
"              After Visit Summary   2018    Sommer Jaimes    MRN: [id number]           Patient Information     Date Of Birth          1940        Visit Information        Provider Department      2018 3:00 PM Mera Gupta MD [facility]        Today's Diagnoses     Intention tremor           Follow-ups after your visit        Who to contact     If you have questions or need follow up information about today's clinic visit or your schedule please contact [facility] directly at [phone].  Normal or non-critical lab and imaging results will be communicated to you by [employer], letter or phone within 4 business days after the clinic has received the results. If you do not hear from us within 7 days, please contact the clinic through [employer] or phone. If you have a critical or abnormal lab result, we will notify you by phone as soon as possible.  Submit refill requests through [employer] or call your pharmacy and they will forward the refill request to us. Please allow 3 business days for your refill to be completed.          Additional Information About Your Visit        MyChart Information     [employer] lets you send messages to your doctor, view your test results, renew your prescriptions, schedule appointments and more. To sign up, go to [email or web address] . Click on \"Log in\" on the left side of the screen, which will take you to the Welcome page. Then click on \"Sign up Now\" on the right side of the page.     You will be asked to enter the access code listed below, as well as some personal information. Please follow the directions to create your username and password.     Your access code is: [email or web address]  Expires: 2018  2:09 PM     Your access code will  in 90 days. If you need help or a new code, please call your [facility] or [phone].        Care EveryWhere ID     This is your Care EveryWhere ID. This could be used by other " organizations to access your [place] medical records  [id number]        Your Vitals Were     Pulse Temperature Respirations Pulse Oximetry BMI (Body Mass Index)       62 97.9  F (36.6  C) (Oral) 17 96% 27.66 kg/m2        Blood Pressure from Last 3 Encounters:   07/16/18 136/70   04/20/18 124/62   10/30/17 126/74    Weight from Last 3 Encounters:   07/16/18 174 lb (78.9 kg)   04/20/18 175 lb (79.4 kg)   10/30/17 174 lb 4.8 oz (79.1 kg)              Today, you had the following     No orders found for display         Today's Medication Changes          These changes are accurate as of 7/16/18  4:27 PM.  If you have any questions, ask your nurse or doctor.               Start taking these medicines.        Dose/Directions    propranolol 60 MG 24 hr capsule   Commonly known as:  INDERAL LA   Used for:  Intention tremor   Replaces:  propranolol 20 MG tablet   Started by:  Mera Gupta MD        Dose:  60 mg   Take 1 capsule (60 mg) by mouth daily   Quantity:  90 capsule   Refills:  1         Stop taking these medicines if you haven't already. Please contact your care team if you have questions.     propranolol 20 MG tablet   Commonly known as:  INDERAL   Replaced by:  propranolol 60 MG 24 hr capsule   Stopped by:  Mera Gupta MD                Where to get your medicines      These medications were sent to [facility] [place], [place] - [address]  [address] [facility] [address]     Phone:  [phone]     propranolol 60 MG 24 hr capsule                Primary Care Provider Office Phone # Fax #    Mera Gupta [phone]       [address]  [facility] [address]        Equal Access to Services     [facility] CHANTELLE AH: Hadii jorge alberto sagastume Sokarly, waaxda luqadaha, qaybta kaalmada adeegyada, lopez perkins. So [facility] [phone].    ATENCIÓN: Si habla español, tiene a jaimes disposición servicios gratuitos de asistencia  lingüística. Elis al [phone].    We comply with applicable federal civil rights laws and [place] laws. We do not discriminate on the basis of race, color, national origin, age, disability, sex, sexual orientation, or gender identity.            Thank you!     Thank you for choosing [facility]  for your care. Our goal is always to provide you with excellent care. Hearing back from our patients is one way we can continue to improve our services. Please take a few minutes to complete the written survey that you may receive in the mail after your visit with us. Thank you!             Your Updated Medication List - Protect others around you: Learn how to safely use, store and throw away your medicines at www.disposemymeds.org.          This list is accurate as of 7/16/18  4:27 PM.  Always use your most recent med list.                   Brand Name Dispense Instructions for use Diagnosis    calcium + D 600-200 MG-UNIT Tabs   Generic drug:  calcium carbonate-vitamin D      Take  by mouth.        CENTRUM SILVER per tablet      Take 1 tablet by mouth daily.        propranolol 60 MG 24 hr capsule    INDERAL LA    90 capsule    Take 1 capsule (60 mg) by mouth daily    Intention tremor

## 2018-10-24 ENCOUNTER — TELEPHONE (OUTPATIENT)
Dept: FAMILY MEDICINE | Facility: CLINIC | Age: 78
End: 2018-10-24

## 2018-10-24 NOTE — TELEPHONE ENCOUNTER
Patient calling stating she has not noted any improvement (or very little), she is almost out of medication and not willing to refill. Will discuss at next appt.    Next 5 appointments (look out 90 days)     Nov 19, 2018 10:30 AM CST   PHYSICAL with Mera Gupta MD   [facility]    [address] [facility] [address] [phone]                Advised patient continue with medication until her follow up appointment. Could refill for 30 days to get her through her appt time when she can discuss this further with Dr. Gupta. Patient is willing to do this. Notified pharmacy to refill for 30 days.    Pharmacy will fill for 30 days and call patient when ready.    Sheryl RICHARDS Triage RN

## 2018-11-29 ENCOUNTER — OFFICE VISIT (OUTPATIENT)
Dept: FAMILY MEDICINE | Facility: CLINIC | Age: 78
End: 2018-11-29
Payer: COMMERCIAL

## 2018-11-29 VITALS
SYSTOLIC BLOOD PRESSURE: 130 MMHG | RESPIRATION RATE: 17 BRPM | HEIGHT: 65 IN | WEIGHT: 182.7 LBS | OXYGEN SATURATION: 96 % | DIASTOLIC BLOOD PRESSURE: 72 MMHG | TEMPERATURE: 97.9 F | HEART RATE: 62 BPM | BODY MASS INDEX: 30.44 KG/M2

## 2018-11-29 DIAGNOSIS — G25.2 INTENTION TREMOR: ICD-10-CM

## 2018-11-29 DIAGNOSIS — Z96.0 PRESENCE OF PESSARY: ICD-10-CM

## 2018-11-29 DIAGNOSIS — R31.9 HEMATURIA, UNSPECIFIED TYPE: ICD-10-CM

## 2018-11-29 DIAGNOSIS — Z13.6 CARDIOVASCULAR SCREENING; LDL GOAL LESS THAN 160: ICD-10-CM

## 2018-11-29 DIAGNOSIS — Z00.00 ROUTINE HISTORY AND PHYSICAL EXAMINATION OF ADULT: Primary | ICD-10-CM

## 2018-11-29 DIAGNOSIS — L21.9 SEBORRHEIC DERMATITIS: ICD-10-CM

## 2018-11-29 DIAGNOSIS — Z12.31 VISIT FOR SCREENING MAMMOGRAM: ICD-10-CM

## 2018-11-29 DIAGNOSIS — R39.15 URINARY URGENCY: ICD-10-CM

## 2018-11-29 DIAGNOSIS — R82.90 NONSPECIFIC FINDING ON EXAMINATION OF URINE: ICD-10-CM

## 2018-11-29 LAB
ALBUMIN SERPL-MCNC: 3.9 G/DL (ref 3.4–5)
ALBUMIN UR-MCNC: 100 MG/DL
ALP SERPL-CCNC: 88 U/L (ref 40–150)
ALT SERPL W P-5'-P-CCNC: 75 U/L (ref 0–50)
AMORPH CRY #/AREA URNS HPF: ABNORMAL /HPF
ANION GAP SERPL CALCULATED.3IONS-SCNC: 8 MMOL/L (ref 3–14)
APPEARANCE UR: ABNORMAL
AST SERPL W P-5'-P-CCNC: 60 U/L (ref 0–45)
BACTERIA #/AREA URNS HPF: ABNORMAL /HPF
BILIRUB SERPL-MCNC: 0.7 MG/DL (ref 0.2–1.3)
BILIRUB UR QL STRIP: NEGATIVE
BUN SERPL-MCNC: 19 MG/DL (ref 7–30)
CALCIUM SERPL-MCNC: 9.5 MG/DL (ref 8.5–10.1)
CHLORIDE SERPL-SCNC: 104 MMOL/L (ref 94–109)
CHOLEST SERPL-MCNC: 208 MG/DL
CO2 SERPL-SCNC: 26 MMOL/L (ref 20–32)
COLOR UR AUTO: YELLOW
CREAT SERPL-MCNC: 1.06 MG/DL (ref 0.52–1.04)
GFR SERPL CREATININE-BSD FRML MDRD: 50 ML/MIN/1.7M2
GLUCOSE SERPL-MCNC: 99 MG/DL (ref 70–99)
GLUCOSE UR STRIP-MCNC: NEGATIVE MG/DL
HDLC SERPL-MCNC: 61 MG/DL
HGB UR QL STRIP: ABNORMAL
HYALINE CASTS #/AREA URNS LPF: ABNORMAL /LPF
KETONES UR STRIP-MCNC: NEGATIVE MG/DL
LDLC SERPL CALC-MCNC: 102 MG/DL
LEUKOCYTE ESTERASE UR QL STRIP: ABNORMAL
NITRATE UR QL: NEGATIVE
NON-SQ EPI CELLS #/AREA URNS LPF: ABNORMAL /LPF
NONHDLC SERPL-MCNC: 147 MG/DL
PH UR STRIP: 5.5 PH (ref 5–7)
POTASSIUM SERPL-SCNC: 4.6 MMOL/L (ref 3.4–5.3)
PROT SERPL-MCNC: 8.1 G/DL (ref 6.8–8.8)
RBC #/AREA URNS AUTO: ABNORMAL /HPF
SODIUM SERPL-SCNC: 138 MMOL/L (ref 133–144)
SOURCE: ABNORMAL
SP GR UR STRIP: 1.02 (ref 1–1.03)
TRIGL SERPL-MCNC: 226 MG/DL
URNS CMNT MICRO: ABNORMAL
UROBILINOGEN UR STRIP-ACNC: 0.2 EU/DL (ref 0.2–1)
WBC #/AREA URNS AUTO: ABNORMAL /HPF

## 2018-11-29 PROCEDURE — 80061 LIPID PANEL: CPT | Performed by: INTERNAL MEDICINE

## 2018-11-29 PROCEDURE — 80053 COMPREHEN METABOLIC PANEL: CPT | Performed by: INTERNAL MEDICINE

## 2018-11-29 PROCEDURE — 36415 COLL VENOUS BLD VENIPUNCTURE: CPT | Performed by: INTERNAL MEDICINE

## 2018-11-29 PROCEDURE — 99214 OFFICE O/P EST MOD 30 MIN: CPT | Mod: 25 | Performed by: INTERNAL MEDICINE

## 2018-11-29 PROCEDURE — G0439 PPPS, SUBSEQ VISIT: HCPCS | Performed by: INTERNAL MEDICINE

## 2018-11-29 PROCEDURE — 87086 URINE CULTURE/COLONY COUNT: CPT | Performed by: INTERNAL MEDICINE

## 2018-11-29 PROCEDURE — 81001 URINALYSIS AUTO W/SCOPE: CPT | Performed by: INTERNAL MEDICINE

## 2018-11-29 RX ORDER — PROPRANOLOL HCL 60 MG
60 CAPSULE, EXTENDED RELEASE 24HR ORAL DAILY
Qty: 90 CAPSULE | Refills: 1 | Status: SHIPPED | OUTPATIENT
Start: 2018-11-29 | End: 2019-08-09

## 2018-11-29 RX ORDER — TRIAMCINOLONE ACETONIDE 1 MG/G
CREAM TOPICAL
Qty: 30 G | Refills: 0 | Status: SHIPPED | OUTPATIENT
Start: 2018-11-29 | End: 2020-01-29

## 2018-11-29 ASSESSMENT — ENCOUNTER SYMPTOMS
DYSURIA: 0
ABDOMINAL PAIN: 0
PALPITATIONS: 0
CHILLS: 0
BREAST MASS: 0
HEARTBURN: 0
JOINT SWELLING: 0
COUGH: 0
DIZZINESS: 0
CONSTIPATION: 0
FREQUENCY: 0
MYALGIAS: 0
NERVOUS/ANXIOUS: 0
HEMATURIA: 0
SHORTNESS OF BREATH: 0
FEVER: 0
HEMATOCHEZIA: 0
EYE PAIN: 0
WEAKNESS: 0
NAUSEA: 0
DIARRHEA: 0
SORE THROAT: 0
PARESTHESIAS: 0

## 2018-11-29 ASSESSMENT — ACTIVITIES OF DAILY LIVING (ADL): CURRENT_FUNCTION: NO ASSISTANCE NEEDED

## 2018-11-29 NOTE — TELEPHONE ENCOUNTER
"Duplicate?    Requested Prescriptions   Pending Prescriptions Disp Refills     propranolol ER (INDERAL LA) 60 MG 24 hr capsule [Pharmacy Med Name: PROPRANOLOL HCL ER 60MG CP24]  Last Written Prescription Date:  11/29/18  Last Fill Quantity: 90,  # refills: 1   Last office visit: 11/29/18 with prescribing provider:  Mera Gupta MD    Future Office Visit:     90 capsule 1     Sig: TAKE ONE CAPSULE BY MOUTH EVERY DAY    Beta-Blockers Protocol Passed    11/29/2018 10:49 AM       Passed - Blood pressure under 140/90 in past 12 months    BP Readings from Last 3 Encounters:   11/29/18 130/72   07/16/18 136/70   04/20/18 124/62                Passed - Patient is age 6 or older       Passed - Recent (12 mo) or future (30 days) visit within the authorizing provider's specialty    Patient had office visit in the last 12 months or has a visit in the next 30 days with authorizing provider or within the authorizing provider's specialty.  See \"Patient Info\" tab in inbasket, or \"Choose Columns\" in Meds & Orders section of the refill encounter.                "

## 2018-11-29 NOTE — MR AVS SNAPSHOT
After Visit Summary   11/29/2018    Sommer Jaimes    MRN: [id number]           Patient Information     Date Of Birth          1940        Visit Information        Provider Department      11/29/2018 9:00 AM Mera Gupta MD [facility] [place]        Today's Diagnoses     Routine history and physical examination of adult    -  1    Urinary urgency        Nonspecific finding on examination of urine        Presence of pessary        Hematuria, unspecified type        Intention tremor        CARDIOVASCULAR SCREENING; LDL GOAL LESS THAN 160        Visit for screening mammogram        Seborrheic dermatitis          Care Instructions      Preventive Health Recommendations    See your health care provider every year to    Review health changes.     Discuss preventive care.      Review your medicines if your doctor has prescribed any.      You no longer need a yearly Pap test unless you've had an abnormal Pap test in the past 10 years. If you have vaginal symptoms, such as bleeding or discharge, be sure to talk with your provider about a Pap test.      Every 1 to 2 years, have a mammogram.  If you are over 69, talk with your health care provider about whether or not you want to continue having screening mammograms.      Every 10 years, have a colonoscopy. Or, have a yearly FIT test (stool test). These exams will check for colon cancer.       Have a cholesterol test every 5 years, or more often if your doctor advises it.       Have a diabetes test (fasting glucose) every three years. If you are at risk for diabetes, you should have this test more often.       At age 65, have a bone density scan (DEXA) to check for osteoporosis (brittle bone disease).    Shots:    Get a flu shot each year.    Get a tetanus shot every 10 years.    Talk to your doctor about your pneumonia vaccines. There are now two you should receive - Pneumovax (PPSV 23) and Prevnar (PCV 13).    Talk to your pharmacist  about the shingles vaccine.    Talk to your doctor about the hepatitis B vaccine.    Nutrition:     Eat at least 5 servings of fruits and vegetables each day.      Eat whole-grain bread, whole-wheat pasta and brown rice instead of white grains and rice.      Get adequate Calcium and Vitamin D.     Lifestyle    Exercise at least 150 minutes a week (30 minutes a day, 5 days a week). This will help you control your weight and prevent disease.      Limit alcohol to one drink per day.      No smoking.       Wear sunscreen to prevent skin cancer.       See your dentist twice a year for an exam and cleaning.      See your eye doctor every 1 to 2 years to screen for conditions such as glaucoma, macular degeneration and cataracts.    Personalized Prevention Plan  You are due for the preventive services outlined below.  Your care team is available to assist you in scheduling these services.  If you have already completed any of these items, please share that information with your care team to update in your medical record.  Health Maintenance Due   Topic Date Due     FALL RISK ASSESSMENT  10/30/2018     Mammogram - yearly  11/07/2018             Follow-ups after your visit        Follow-up notes from your care team     Return in about 1 year (around 11/29/2019).      Future tests that were ordered for you today     Open Future Orders        Priority Expected Expires Ordered    MA Screening Digital Bilateral Routine  11/29/2019 11/29/2018            Who to contact     If you have questions or need follow up information about today's clinic visit or your schedule please contact [facility] directly at [phone].  Normal or non-critical lab and imaging results will be communicated to you by MyChart, letter or phone within 4 business days after the clinic has received the results. If you do not hear from us within 7 days, please contact the clinic through MyChart or phone. If you have a critical or abnormal lab  "result, we will notify you by phone as soon as possible.  Submit refill requests through [employer] or call your pharmacy and they will forward the refill request to us. Please allow 3 business days for your refill to be completed.          Additional Information About Your Visit        Care EveryWhere ID     This is your Care EveryWhere ID. This could be used by other organizations to access your [facility] medical records  [id number]        Your Vitals Were     Pulse Temperature Respirations Height Pulse Oximetry BMI (Body Mass Index)    62 97.9  F (36.6  C) (Oral) 17 5' 4.75\" (1.645 m) 96% 30.64 kg/m2       Blood Pressure from Last 3 Encounters:   11/29/18 130/72   07/16/18 136/70   04/20/18 124/62    Weight from Last 3 Encounters:   11/29/18 182 lb 11.2 oz (82.9 kg)   07/16/18 174 lb (78.9 kg)   04/20/18 175 lb (79.4 kg)              We Performed the Following     Comprehensive metabolic panel     Lipid panel reflex to direct LDL Fasting     UA reflex to Microscopic and Culture     Urine Culture Aerobic Bacterial     Urine Microscopic          Today's Medication Changes          These changes are accurate as of 11/29/18 10:16 AM.  If you have any questions, ask your nurse or doctor.               Start taking these medicines.        Dose/Directions    triamcinolone 0.1 % external cream   Commonly known as:  KENALOG   Used for:  Seborrheic dermatitis   Started by:  Mera Gupta MD        Apply sparingly to affected area three times daily for 14 days.   Quantity:  30 g   Refills:  0            Where to get your medicines      These medications were sent to [facility] [place] - [id number]  [address] [address] [address]     Phone:  [phone]     propranolol ER 60 MG 24 hr capsule    triamcinolone 0.1 % external cream                Primary Care Provider Office Phone # Fax #    Mera Gupta [phone] [address]  [place] " [address]        Equal Access to Services     [facility]: Hadii jorge alberto matthews tanika Mace, wajennieda luqjosselin, qashabbir cocobrandongalen woods, lopez perkins. So [facility] [phone].    ATENCIÓN: Si habla español, tiene a jaimes disposición servicios gratuitos de asistencia lingüística. Joyame al [phone].    We comply with applicable federal civil rights laws and [place] laws. We do not discriminate on the basis of race, color, national origin, age, disability, sex, sexual orientation, or gender identity.            Thank you!     Thank you for choosing [facility] [place]  for your care. Our goal is always to provide you with excellent care. Hearing back from our patients is one way we can continue to improve our services. Please take a few minutes to complete the written survey that you may receive in the mail after your visit with us. Thank you!             Your Updated Medication List - Protect others around you: Learn how to safely use, store and throw away your medicines at www.disposemymeds.org.          This list is accurate as of 11/29/18 10:16 AM.  Always use your most recent med list.                   Brand Name Dispense Instructions for use Diagnosis    calcium + D 600-200 MG-UNIT Tabs   Generic drug:  calcium carbonate-vitamin D      Take  by mouth.        multivitamin tablet      Take 1 tablet by mouth daily.        propranolol ER 60 MG 24 hr capsule    INDERAL LA    90 capsule    Take 1 capsule (60 mg) by mouth daily    Intention tremor       triamcinolone 0.1 % external cream    KENALOG    30 g    Apply sparingly to affected area three times daily for 14 days.    Seborrheic dermatitis

## 2018-11-29 NOTE — PATIENT INSTRUCTIONS

## 2018-11-29 NOTE — LETTER
September 6, 2019      Sommer Jaimes  [address] [place] [address]        Dear Ms. Sommer Jaimes,    Here are your labs from the visit in November for your records. Dr. Gupta will continue to follow your kidney and liver functions.    Hilario Santana MD  Internal Medicine and Pediatrics  For Dr. Gupta

## 2018-11-29 NOTE — PROGRESS NOTES
"SUBJECTIVE:   Sommer Jaimes is a 78 year old female who presents for Preventive Visit.      Are you in the first 12 months of your Medicare coverage?  No    Annual Wellness Visit     In general, how would you rate your overall health?  Good    Frequency of exercise:  2-3 days/week    Duration of exercise:  15-30 minutes    Do you usually eat at least 4 servings of fruit and vegetables a day, include whole grains    & fiber and avoid regularly eating high fat or \"junk\" foods?  No    Taking medications regularly:  Yes    Medication side effects:  None    Ability to successfully perform activities of daily living:  No assistance needed    Home Safety:  No safety concerns identified    Hearing Impairment:  Need to ask people to speak up or repeat themselves    In the past 6 months, have you been bothered by leaking of urine?  No    In general, how would you rate your overall mental or emotional health?  Good    PHQ-2 Total Score: 0    Additional concerns today:  No    Do you feel safe in your environment? Yes    Do you have a Health Care Directive? Yes: Patient states has Advance Directive and will bring in a copy to clinic.      Fall risk  Fallen 2 or more times in the past year?: No  Any fall with injury in the past year?: No    Cognitive Screening   1) Repeat 3 items (Leader, Season, Table)    2) Clock draw: NORMAL  3) 3 item recall: Recalls 3 objects  Results: 3 items recalled: COGNITIVE IMPAIRMENT LESS LIKELY    Mini-CogTM Copyright S Kole. Licensed by the author for use in [facility]; reprinted with permission (mila@.[facility]). All rights reserved.      Do you have sleep apnea, excessive snoring or daytime drowsiness?: no    Reviewed and updated as needed this visit by clinical staff  Tobacco  Allergies  Meds  Problems  Med Hx  Surg Hx  Fam Hx  Soc Hx          Reviewed and updated as needed this visit by Provider  Allergies  Meds  Problems        Social History   Substance Use Topics     " Smoking status: Never Smoker     Smokeless tobacco: Never Used     Alcohol use 0.0 oz/week     0 Standard drinks or equivalent per week      Comment: very seldom       Alcohol Use 11/29/2018   If you drink alcohol do you typically have greater than 3 drinks per day OR greater than 7 drinks per week? No           Current providers sharing in care for this patient include:   Patient Care Team:  Mera Gupta MD as PCP - General    The following health maintenance items are reviewed in Epic and correct as of today:  Health Maintenance   Topic Date Due     MAMMO Q1 YR  11/07/2018     FALL RISK ASSESSMENT  11/29/2019     PHQ-2 Q1 YR  11/29/2019     ADVANCE DIRECTIVE PLANNING Q5 YRS  10/30/2022     TETANUS IMMUNIZATION (SYSTEM ASSIGNED)  03/06/2023     COLONOSCOPY Q10 YR  04/19/2023     LIPID SCREEN Q5 YR FEMALE (SYSTEM ASSIGNED)  11/29/2023     DEXA SCAN SCREENING (SYSTEM ASSIGNED)  Completed     PNEUMOCOCCAL  Completed     INFLUENZA VACCINE  Completed     Labs reviewed in EPIC  BP Readings from Last 3 Encounters:   11/29/18 130/72   07/16/18 136/70   04/20/18 124/62    Wt Readings from Last 3 Encounters:   11/29/18 182 lb 11.2 oz (82.9 kg)   07/16/18 174 lb (78.9 kg)   04/20/18 175 lb (79.4 kg)                  Patient Active Problem List   Diagnosis     Symptomatic menopausal or female climacteric states     Urethral fistula     Hematuria     CARDIOVASCULAR SCREENING; LDL GOAL LESS THAN 160     HYPERLIPIDEMIA LDL GOAL <160     Advanced directives, counseling/discussion     Anxiety     Presence of pessary     Past Surgical History:   Procedure Laterality Date     C APPENDECTOMY  1973    Appendectomy.     C NONSPECIFIC PROCEDURE      Sebaceous cyst.     C NONSPECIFIC PROCEDURE      Ganglion cyst.     C NONSPECIFIC PROCEDURE      Oral surg./Plastic surg. - chin.     COLONOSCOPY  4/19/2013    colonoscopy     COLONOSCOPY  4/19/2013    Procedure: COLONOSCOPY;  Colonoscopy ;  Surgeon: Harman Becerra MD;  Location:  [facility]     HC COLONOSCOPY THRU STOMA, DIAGNOSTIC  4/02    Dr. Tavarez, normal no polyps     HC FLEX SIGMOIDOSCOPY W/WO QUYNH SPEC BY BRUSH/WASH  1993    Flex sig. and  colonoscopy 2002     HC REMOVAL GALLBLADDER  1973    Cholecystectomy.     TONSILLECTOMY & ADENOIDECTOMY      T&A age 12       Social History   Substance Use Topics     Smoking status: Never Smoker     Smokeless tobacco: Never Used     Alcohol use 0.0 oz/week     0 Standard drinks or equivalent per week      Comment: very seldom     Family History   Problem Relation Age of Onset     Diabetes Mother      Heart Disease Mother      Cancer - colorectal Father      Colon Cancer late 70's     Circulatory Sister      CHF     Heart Disease Sister      defib placed     Neurologic Disorder Son      Multiple sclerosis     Cerebrovascular Disease Sister      Cancer - colorectal Other      niece in her 50's          Current Outpatient Prescriptions   Medication Sig Dispense Refill     calcium carbonate-vitamin D (CALCIUM + D) 600-200 MG-UNIT TABS Take  by mouth.       Multiple Vitamins-Minerals (CENTRUM SILVER) per tablet Take 1 tablet by mouth daily.       propranolol ER (INDERAL LA) 60 MG 24 hr capsule Take 1 capsule (60 mg) by mouth daily 90 capsule 1     triamcinolone (KENALOG) 0.1 % external cream Apply sparingly to affected area three times daily for 14 days. 30 g 0     Allergies   Allergen Reactions     Erythromycin Anaphylaxis     severe reaction, rash and throat swelling.      Mammogram Screening: Patient over age 75, has elected to continue with mammography screening.    Review of Systems   Constitutional: Negative for chills and fever.   HENT: Negative for congestion, ear pain and sore throat.    Eyes: Negative for pain and visual disturbance.   Respiratory: Negative for cough and shortness of breath.    Cardiovascular: Negative for chest pain, palpitations and peripheral edema.   Gastrointestinal: Negative for abdominal pain, constipation, diarrhea,  "heartburn, hematochezia and nausea.   Breasts:  Negative for tenderness, breast mass and discharge.   Genitourinary: Negative for dysuria, frequency, genital sores, hematuria, pelvic pain, urgency, vaginal bleeding and vaginal discharge.        Hx of microscopic hematuria;  Pessary in placed; managed quarterly by Dr. Mantilla   Musculoskeletal: Negative for joint swelling and myalgias.   Skin: Negative for rash.   Neurological: Negative for dizziness, weakness and paresthesias.   Psychiatric/Behavioral: Negative for mood changes. The patient is not nervous/anxious.          OBJECTIVE:   /72  Pulse 62  Temp 97.9  F (36.6  C) (Oral)  Resp 17  Ht 5' 4.75\" (1.645 m)  Wt 182 lb 11.2 oz (82.9 kg)  SpO2 96%  BMI 30.64 kg/m2 Estimated body mass index is 30.64 kg/(m^2) as calculated from the following:    Height as of this encounter: 5' 4.75\" (1.645 m).    Weight as of this encounter: 182 lb 11.2 oz (82.9 kg).  Physical Exam  GENERAL: healthy, alert and no distress  HENT: normal cephalic/atraumatic, ear canals and TM's normal, oropharynx clear and oral mucous membranes moist  NECK: no adenopathy, no asymmetry, masses, or scars and thyroid normal to palpation  RESP: lungs clear to auscultation - no rales, rhonchi or wheezes  CV: regular rates and rhythm, normal S1 S2, no S3 or S4, no murmur, click or rub, peripheral pulses strong and no peripheral edema  ABDOMEN: soft, nontender, no hepatosplenomegaly, no masses and bowel sounds normal   (female): no costovertebral angle tenderness, no suprapubic tenderness  MS: no gross musculoskeletal defects noted, no edema  SKIN: nape of neck in hairline, thickened scale  NEURO: Normal strength and tone, sensory exam grossly normal and mentation intact  PSYCH: mentation appears normal, affect normal/bright        ASSESSMENT / PLAN:   (Z00.00) Routine history and physical examination of adult  (primary encounter diagnosis)  Comment: wellness visit reviewed; HEALTH CARE " "MAINTENANCE reviewed  Plan: MA Screening Digital Bilateral          (R39.15) Urinary urgency  Comment: remote hx of microscopic hematuria; interested in urine evaluation; she does have a pessary placed and monitored by Dr. Mantilla.- quarterly  Plan: UA reflex to Microscopic and Culture, Urine         Microscopic, Urine Culture Aerobic Bacterial          (R82.90) Nonspecific finding on examination of urine  Comment:   Plan: Urine Culture Aerobic Bacterial          (Z92.89) Presence of pessary  Comment: Dr Mantilla; he places it and it remains in place for 3 months at a time; last there about 6 weeks ago.   Plan: per GYN    (R31.9) Hematuria, unspecified type  Comment: hx of microscopic hematuria; \"kink in the ureter\"' past Urology evaluation in [place]; eval wt Dr. Mccall, urology 2016  Plan: if recurrent or other symptoms arise, consider urology reevaluation.    (G25.2) Intention tremor  Comment: L>R; intention tremor;  Propranolol started Fall 2017; writing OK, no Parkinson's; gait OK; she had not taken meds yet this AM  Plan: propranolol ER (INDERAL LA) 60 MG 24 hr capsule          (Z13.6) CARDIOVASCULAR SCREENING; LDL GOAL LESS THAN 160  Comment: goals of therapy reviwed  Plan: Comprehensive metabolic panel, Lipid panel         reflex to direct LDL Fasting          (Z12.31) Visit for screening mammogram  Comment:   Plan: mammogram ordered    (L21.9) Seborrheic dermatitis  Comment: back of scalp, near nape of neck; intermittent; desires refill of topical Triamcinolone; area thick and scaly; denies other skin involvement. No hx of psoriasis  Plan: triamcinolone (KENALOG) 0.1 % external cream        Creamed used successfully in the past; could consider solution given hair involvment      End of Life Planning:  Patient currently has an advanced directive: as noted in chart    COUNSELING:  Reviewed preventive health counseling, as reflected in patient instructions       Regular exercise       Healthy diet/nutrition       " "Immunizations    Discussed Shingrix series of 2          BP Readings from Last 1 Encounters:   11/29/18 130/72     Estimated body mass index is 30.64 kg/(m^2) as calculated from the following:    Height as of this encounter: 5' 4.75\" (1.645 m).    Weight as of this encounter: 182 lb 11.2 oz (82.9 kg).    BP Screening:   Last 3 BP Readings:    BP Readings from Last 3 Encounters:   11/29/18 130/72   07/16/18 136/70   04/20/18 124/62       The following was recommended to the patient:  Re-screen BP within a year and recommended lifestyle modifications  Weight management plan: Discussed healthy diet and exercise guidelines     reports that she has never smoked. She has never used smokeless tobacco.      Appropriate preventive services were discussed with this patient, including applicable screening as appropriate for cardiovascular disease, diabetes, osteopenia/osteoporosis, and glaucoma.  As appropriate for age/gender, discussed screening for colorectal cancer, prostate cancer, breast cancer, and cervical cancer. Checklist reviewing preventive services available has been given to the patient.    Reviewed patients plan of care and provided an AVS. The Care Plan for Sommer meets the Care Plan requirement. This Care Plan has been established and reviewed with the Patient.    Counseling Resources:  ATP IV Guidelines  Pooled Cohorts Equation Calculator  Breast Cancer Risk Calculator  FRAX Risk Assessment  ICSI Preventive Guidelines  Dietary Guidelines for Americans, 2010  USDA's MyPlate  ASA Prophylaxis  Lung CA Screening    Mera Gupta MD  Internal Medicine   [facility] [place]  25 minutes in addition to WELLNESS VISIT are spent with patient, over 50% of that time spent providing counselling, discussing and reviewing medical conditions/concerns, meds and potential side effects.    "

## 2018-11-30 LAB
BACTERIA SPEC CULT: NORMAL
SPECIMEN SOURCE: NORMAL

## 2018-12-01 RX ORDER — PROPRANOLOL HCL 60 MG
CAPSULE, EXTENDED RELEASE 24HR ORAL
Qty: 90 CAPSULE | Refills: 1 | OUTPATIENT
Start: 2018-12-01

## 2018-12-11 ENCOUNTER — HOSPITAL ENCOUNTER (OUTPATIENT)
Dept: MAMMOGRAPHY | Facility: CLINIC | Age: 78
Discharge: HOME | End: 2018-12-11
Attending: INTERNAL MEDICINE | Admitting: INTERNAL MEDICINE
Payer: COMMERCIAL

## 2018-12-11 DIAGNOSIS — Z12.31 VISIT FOR SCREENING MAMMOGRAM: ICD-10-CM

## 2018-12-11 PROCEDURE — 77063 BREAST TOMOSYNTHESIS BI: CPT

## 2019-07-26 ENCOUNTER — TRANSFERRED RECORDS (OUTPATIENT)
Dept: HEALTH INFORMATION MANAGEMENT | Facility: CLINIC | Age: 79
End: 2019-07-26

## 2019-08-12 ENCOUNTER — OFFICE VISIT (OUTPATIENT)
Dept: FAMILY MEDICINE | Facility: CLINIC | Age: 79
End: 2019-08-12
Payer: COMMERCIAL

## 2019-08-12 VITALS
HEART RATE: 52 BPM | RESPIRATION RATE: 16 BRPM | BODY MASS INDEX: 30.19 KG/M2 | DIASTOLIC BLOOD PRESSURE: 66 MMHG | SYSTOLIC BLOOD PRESSURE: 128 MMHG | TEMPERATURE: 98.1 F | WEIGHT: 180 LBS | OXYGEN SATURATION: 96 %

## 2019-08-12 DIAGNOSIS — N18.30 CKD (CHRONIC KIDNEY DISEASE) STAGE 3, GFR 30-59 ML/MIN (H): ICD-10-CM

## 2019-08-12 DIAGNOSIS — R00.2 PALPITATIONS: ICD-10-CM

## 2019-08-12 DIAGNOSIS — I49.3 VENTRICULAR ECTOPY: ICD-10-CM

## 2019-08-12 DIAGNOSIS — G25.2 INTENTION TREMOR: ICD-10-CM

## 2019-08-12 DIAGNOSIS — F41.9 ANXIETY: Primary | ICD-10-CM

## 2019-08-12 PROCEDURE — 96127 BRIEF EMOTIONAL/BEHAV ASSMT: CPT | Performed by: INTERNAL MEDICINE

## 2019-08-12 PROCEDURE — 99214 OFFICE O/P EST MOD 30 MIN: CPT | Performed by: INTERNAL MEDICINE

## 2019-08-12 PROCEDURE — 93000 ELECTROCARDIOGRAM COMPLETE: CPT | Performed by: INTERNAL MEDICINE

## 2019-08-12 RX ORDER — SERTRALINE HYDROCHLORIDE 25 MG/1
25 TABLET, FILM COATED ORAL DAILY
Qty: 30 TABLET | Refills: 3 | Status: SHIPPED | OUTPATIENT
Start: 2019-08-12 | End: 2020-01-29

## 2019-08-12 RX ORDER — PROPRANOLOL HCL 60 MG
60 CAPSULE, EXTENDED RELEASE 24HR ORAL DAILY
Qty: 90 CAPSULE | Refills: 1 | Status: SHIPPED | OUTPATIENT
Start: 2019-08-12 | End: 2020-01-29

## 2019-08-12 ASSESSMENT — PATIENT HEALTH QUESTIONNAIRE - PHQ9
SUM OF ALL RESPONSES TO PHQ QUESTIONS 1-9: 3
5. POOR APPETITE OR OVEREATING: SEVERAL DAYS

## 2019-08-12 ASSESSMENT — ANXIETY QUESTIONNAIRES
7. FEELING AFRAID AS IF SOMETHING AWFUL MIGHT HAPPEN: NOT AT ALL
1. FEELING NERVOUS, ANXIOUS, OR ON EDGE: MORE THAN HALF THE DAYS
IF YOU CHECKED OFF ANY PROBLEMS ON THIS QUESTIONNAIRE, HOW DIFFICULT HAVE THESE PROBLEMS MADE IT FOR YOU TO DO YOUR WORK, TAKE CARE OF THINGS AT HOME, OR GET ALONG WITH OTHER PEOPLE: NOT DIFFICULT AT ALL
2. NOT BEING ABLE TO STOP OR CONTROL WORRYING: MORE THAN HALF THE DAYS
3. WORRYING TOO MUCH ABOUT DIFFERENT THINGS: MORE THAN HALF THE DAYS
GAD7 TOTAL SCORE: 7
6. BECOMING EASILY ANNOYED OR IRRITABLE: NOT AT ALL
5. BEING SO RESTLESS THAT IT IS HARD TO SIT STILL: NOT AT ALL

## 2019-08-12 NOTE — PROGRESS NOTES
Subjective     Sommer Jaimes is a 78 year old female who presents to clinic today for the following health issues:    HPI   Pt would like to follow up on [employer] screening that she had done       How many servings of fruits and vegetables do you eat daily?  2-3    On average, how many sweetened beverages do you drink each day (soda, juice, sweet tea, etc)?   1    How many days per week do you miss taking your medication? N/A        Abnormal Mood Symptoms      Duration: has been intermittent but has now been more persistent; Anxious, worried     Description:  Depression: YES  Anxiety: YES  Panic attacks: YES-      Accompanying signs and symptoms: see PHQ-9 and SARAY scores- PHQ-9  3,   SARAY-7  7    History (similar episodes/previous evaluation): feels overwhelmed and does not like to be in a crowd    Precipitating or alleviating factors: if she rides her bike or walks on the treadmill it does seem to help some    Therapies tried and outcome: previously was on something but stopped because she does not like to take medications     Chart reviewed- 2017  Sertraline- briefly, Citalopram- did not take    Many stressors; close friend struggling with health challenges.     Palpitations  [employer] screening reviewed; possible ventricular ectopy        Patient Active Problem List   Diagnosis     Symptomatic menopausal or female climacteric states     Urethral fistula     Hematuria     CARDIOVASCULAR SCREENING; LDL GOAL LESS THAN 160     HYPERLIPIDEMIA LDL GOAL <160     Advanced directives, counseling/discussion     Anxiety     Presence of pessary     Palpitations     Past Surgical History:   Procedure Laterality Date     C APPENDECTOMY  1973    Appendectomy.     C NONSPECIFIC PROCEDURE      Sebaceous cyst.     C NONSPECIFIC PROCEDURE      Ganglion cyst.     C NONSPECIFIC PROCEDURE      Oral surg./Plastic surg. - chin.     COLONOSCOPY  4/19/2013    colonoscopy     COLONOSCOPY  4/19/2013    Procedure: COLONOSCOPY;   Colonoscopy ;  Surgeon: Harman Becerra MD;  Location: [facility]     HC COLONOSCOPY THRU STOMA, DIAGNOSTIC  4/02    Dr. Tavarez, normal no polyps     HC FLEX SIGMOIDOSCOPY W/WO QUYNH SPEC BY BRUSH/WASH  1993    Flex sig. and  colonoscopy 2002     HC REMOVAL GALLBLADDER  1973    Cholecystectomy.     TONSILLECTOMY & ADENOIDECTOMY      T&A age 12       Social History     Tobacco Use     Smoking status: Never Smoker     Smokeless tobacco: Never Used   Substance Use Topics     Alcohol use: Yes     Alcohol/week: 0.0 oz     Comment: very seldom     Family History   Problem Relation Age of Onset     Diabetes Mother      Heart Disease Mother      Cancer - colorectal Father         Colon Cancer late 70's     Circulatory Sister         CHF     Heart Disease Sister         defib placed     Neurologic Disorder Son         Multiple sclerosis     Cerebrovascular Disease Sister      Cancer - colorectal Other         niece in her 50's          Current Outpatient Medications   Medication Sig Dispense Refill     calcium carbonate-vitamin D (CALCIUM + D) 600-200 MG-UNIT TABS Take  by mouth.       Multiple Vitamins-Minerals (CENTRUM SILVER) per tablet Take 1 tablet by mouth daily.       triamcinolone (KENALOG) 0.1 % external cream Apply sparingly to affected area three times daily for 14 days. 30 g 0     Allergies   Allergen Reactions     Erythromycin Anaphylaxis     severe reaction, rash and throat swelling.      BP Readings from Last 3 Encounters:   08/12/19 128/66   11/29/18 130/72   07/16/18 136/70    Wt Readings from Last 3 Encounters:   08/12/19 81.6 kg (180 lb)   11/29/18 82.9 kg (182 lb 11.2 oz)   07/16/18 78.9 kg (174 lb)            Reviewed and updated as needed this visit by Provider  Tobacco  Allergies  Meds  Problems  Med Hx  Surg Hx  Fam Hx         Review of Systems   ROS COMP: CONSTITUTIONAL: NEGATIVE for fever, chills, change in weight  ENT/MOUTH: NEGATIVE for ear, mouth and throat problems  RESP: NEGATIVE for  significant cough or SOB  CV: palpitations and ectopy reviewed  GI: NEGATIVE for nausea, abdominal pain, heartburn, or change in bowel habits  MUSCULOSKELETAL: NEGATIVE for significant arthralgias or myalgia  NEURO: tremor;   ENDOCRINE: lipids reviewed;   Lab Results   Component Value Date     11/29/2018     HEME/ALLERGY/IMMUNE: NEGATIVE for bleeding problems  PSYCHIATRIC: NEGATIVE for changes in mood or affect      Objective    /66   Pulse 52   Temp 98.1  F (36.7  C) (Oral)   Resp 16   Wt 81.6 kg (180 lb)   SpO2 96%   BMI 30.19 kg/m    Body mass index is 30.19 kg/m .  Physical Exam   GENERAL: healthy, alert and no distress  NECK: no adenopathy, no asymmetry, masses, or scars and thyroid normal to palpation  RESP: lungs clear to auscultation - no rales, rhonchi or wheezes  CV: regular rate and rhythm, normal S1 S2, no S3 or S4, no murmur, click or rub, no peripheral edema and peripheral pulses strong  ABDOMEN: soft, nontender, no hepatosplenomegaly, no masses and bowel sounds normal  MS: no gross musculoskeletal defects noted, no edema  Neurology; intention tremor; no dysmetria or apraxia;     Diagnostic Test Results:  Labs reviewed in Epic      EKG: bigeminy    Assessment & Plan     (F41.9) Anxiety  (primary encounter diagnosis)  Comment:   SARAY-7 SCORE 11/13/2013 8/12/2019   Total Score 10 -   Total Score - 7   reviewed use of medications;  Goals of therapy reviewed; discussed adjusting dose; she prefers to remain on same dose.   Plan: sertraline (ZOLOFT) 25 MG tablet          (R00.2) Palpitations  Comment: ectopy noted; monitor HR; review use of BB; continue ; could consider slightly higher dose in future.  Plan: EKG 12-lead complete w/read - Clinics          (I49.3) Ventricular ectopy  Comment: continue BB; improvement noted;  ectopy noted; monitor HR; review use of BB; continue ; could consider slightly higher dose in future.  Plan: BB well tolerated and effective    (G25.2) Intention  tremor  Comment: L>R; intention tremor;  Propranolol started Fall 2017; writing OK, no Parkinson's; gait OK  Continue same dose of BB  Plan: propranolol ER (INDERAL LA) 60 MG 24 hr capsule               No follow-ups on file.    Mera Gupta MD  Internal Medicine   [facility]

## 2019-08-13 ASSESSMENT — ANXIETY QUESTIONNAIRES: GAD7 TOTAL SCORE: 7

## 2019-10-07 PROBLEM — N18.30 CKD (CHRONIC KIDNEY DISEASE) STAGE 3, GFR 30-59 ML/MIN (H): Status: ACTIVE | Noted: 2019-10-07

## 2020-01-03 ENCOUNTER — OFFICE VISIT (OUTPATIENT)
Dept: FAMILY MEDICINE | Facility: CLINIC | Age: 80
End: 2020-01-03
Payer: COMMERCIAL

## 2020-01-03 VITALS
HEART RATE: 69 BPM | OXYGEN SATURATION: 95 % | DIASTOLIC BLOOD PRESSURE: 88 MMHG | TEMPERATURE: 98 F | SYSTOLIC BLOOD PRESSURE: 130 MMHG | RESPIRATION RATE: 18 BRPM

## 2020-01-03 DIAGNOSIS — R19.7 DIARRHEA, UNSPECIFIED TYPE: ICD-10-CM

## 2020-01-03 DIAGNOSIS — Z86.19 HISTORY OF CLOSTRIDIUM DIFFICILE INFECTION: ICD-10-CM

## 2020-01-03 DIAGNOSIS — R19.7 DIARRHEA, UNSPECIFIED TYPE: Primary | ICD-10-CM

## 2020-01-03 LAB
ALBUMIN SERPL-MCNC: 3.5 G/DL (ref 3.4–5)
ALP SERPL-CCNC: 80 U/L (ref 40–150)
ALT SERPL W P-5'-P-CCNC: 30 U/L (ref 0–50)
ANION GAP SERPL CALCULATED.3IONS-SCNC: 5 MMOL/L (ref 3–14)
AST SERPL W P-5'-P-CCNC: 24 U/L (ref 0–45)
BASOPHILS # BLD AUTO: 0 10E9/L (ref 0–0.2)
BASOPHILS NFR BLD AUTO: 0.4 %
BILIRUB SERPL-MCNC: 0.6 MG/DL (ref 0.2–1.3)
BUN SERPL-MCNC: 21 MG/DL (ref 7–30)
C COLI+JEJUNI+LARI FUSA STL QL NAA+PROBE: NOT DETECTED
C DIFF TOX B STL QL: NEGATIVE
CALCIUM SERPL-MCNC: 9.2 MG/DL (ref 8.5–10.1)
CHLORIDE SERPL-SCNC: 108 MMOL/L (ref 94–109)
CO2 SERPL-SCNC: 27 MMOL/L (ref 20–32)
CREAT SERPL-MCNC: 0.96 MG/DL (ref 0.52–1.04)
DIFFERENTIAL METHOD BLD: NORMAL
EC STX1 GENE STL QL NAA+PROBE: NOT DETECTED
EC STX2 GENE STL QL NAA+PROBE: NOT DETECTED
ENTERIC PATHOGEN COMMENT: NORMAL
EOSINOPHIL # BLD AUTO: 0.2 10E9/L (ref 0–0.7)
EOSINOPHIL NFR BLD AUTO: 2.5 %
ERYTHROCYTE [DISTWIDTH] IN BLOOD BY AUTOMATED COUNT: 12.6 % (ref 10–15)
GFR SERPL CREATININE-BSD FRML MDRD: 56 ML/MIN/{1.73_M2}
GLUCOSE SERPL-MCNC: 102 MG/DL (ref 70–99)
HCT VFR BLD AUTO: 43.5 % (ref 35–47)
HGB BLD-MCNC: 14.4 G/DL (ref 11.7–15.7)
LYMPHOCYTES # BLD AUTO: 1.6 10E9/L (ref 0.8–5.3)
LYMPHOCYTES NFR BLD AUTO: 23 %
MCH RBC QN AUTO: 31.2 PG (ref 26.5–33)
MCHC RBC AUTO-ENTMCNC: 33.1 G/DL (ref 31.5–36.5)
MCV RBC AUTO: 94 FL (ref 78–100)
MONOCYTES # BLD AUTO: 0.7 10E9/L (ref 0–1.3)
MONOCYTES NFR BLD AUTO: 9.9 %
NEUTROPHILS # BLD AUTO: 4.4 10E9/L (ref 1.6–8.3)
NEUTROPHILS NFR BLD AUTO: 64.2 %
NOROV GI+II ORF1-ORF2 JNC STL QL NAA+PR: NOT DETECTED
PLATELET # BLD AUTO: 221 10E9/L (ref 150–450)
POTASSIUM SERPL-SCNC: 4.2 MMOL/L (ref 3.4–5.3)
PROT SERPL-MCNC: 7.3 G/DL (ref 6.8–8.8)
RBC # BLD AUTO: 4.61 10E12/L (ref 3.8–5.2)
RVA NSP5 STL QL NAA+PROBE: NOT DETECTED
SALMONELLA SP RPOD STL QL NAA+PROBE: NOT DETECTED
SHIGELLA SP+EIEC IPAH STL QL NAA+PROBE: NOT DETECTED
SODIUM SERPL-SCNC: 140 MMOL/L (ref 133–144)
SPECIMEN SOURCE: NORMAL
V CHOL+PARA RFBL+TRKH+TNAA STL QL NAA+PR: NOT DETECTED
WBC # BLD AUTO: 6.9 10E9/L (ref 4–11)
Y ENTERO RECN STL QL NAA+PROBE: NOT DETECTED

## 2020-01-03 PROCEDURE — 80053 COMPREHEN METABOLIC PANEL: CPT | Performed by: NURSE PRACTITIONER

## 2020-01-03 PROCEDURE — 99214 OFFICE O/P EST MOD 30 MIN: CPT | Performed by: NURSE PRACTITIONER

## 2020-01-03 PROCEDURE — 87506 IADNA-DNA/RNA PROBE TQ 6-11: CPT | Performed by: NURSE PRACTITIONER

## 2020-01-03 PROCEDURE — 87493 C DIFF AMPLIFIED PROBE: CPT | Mod: 59 | Performed by: NURSE PRACTITIONER

## 2020-01-03 PROCEDURE — 85025 COMPLETE CBC W/AUTO DIFF WBC: CPT | Performed by: NURSE PRACTITIONER

## 2020-01-03 PROCEDURE — 36415 COLL VENOUS BLD VENIPUNCTURE: CPT | Performed by: NURSE PRACTITIONER

## 2020-01-03 NOTE — PROGRESS NOTES
Subjective     Sommer Jaimes is a 79 year old female who presents to clinic today for the following health issues:    HPI   Diarrhea      Duration: over 1 week    Description:       Consistency of stool: loose       Blood in stool: no        Number of loose stools past 24 hours: 5-6    Intensity:  moderate, severe    Accompanying signs and symptoms:       Fever: no        Nausea/vomitting: YES- Nausea       Abdominal pain: YES       Weight loss: no     History (recent antibiotics or travel/ill contacts/med changes/testing done): None    Precipitating or alleviating factors: None    Therapies tried and outcome: Imodium AD    Symptoms started 1 week ago.  5-6+ loose stools per day.  Hx of c. Diff about 5 years ago.  No known cause at that time.  No recent abx.  No recent diet change, travel.  No new meds.  Some mild abdominal cramping prior to stools.  Some mild nausea, no vomiting.  No fevers.  No chest pain, palpitations, sob.  She denies any weight loss- declined weight in clinic today.  No black or bloody stools.  No urinary symptoms.      Patient Active Problem List   Diagnosis     Symptomatic menopausal or female climacteric states     Urethral fistula     Hematuria     CARDIOVASCULAR SCREENING; LDL GOAL LESS THAN 160     HYPERLIPIDEMIA LDL GOAL <160     Advanced directives, counseling/discussion     Anxiety     Presence of pessary     Palpitations     CKD (chronic kidney disease) stage 3, GFR 30-59 ml/min (H)     Past Surgical History:   Procedure Laterality Date     C APPENDECTOMY  1973    Appendectomy.     C NONSPECIFIC PROCEDURE      Sebaceous cyst.     C NONSPECIFIC PROCEDURE      Ganglion cyst.     C NONSPECIFIC PROCEDURE      Oral surg./Plastic surg. - chin.     COLONOSCOPY  4/19/2013    colonoscopy     COLONOSCOPY  4/19/2013    Procedure: COLONOSCOPY;  Colonoscopy ;  Surgeon: Harman Becerra MD;  Location:  GI     HC COLONOSCOPY THRU STOMA, DIAGNOSTIC  4/02    Dr. Tavarez, normal no polyps     HC  FLEX SIGMOIDOSCOPY W/WO QUYNH SPEC BY BRUSH/WASH  1993    Flex sig. and  colonoscopy 2002     HC REMOVAL GALLBLADDER  1973    Cholecystectomy.     TONSILLECTOMY & ADENOIDECTOMY      T&A age 12       Social History     Tobacco Use     Smoking status: Never Smoker     Smokeless tobacco: Never Used   Substance Use Topics     Alcohol use: Yes     Alcohol/week: 0.0 standard drinks     Comment: very seldom     Family History   Problem Relation Age of Onset     Diabetes Mother      Heart Disease Mother      Cancer - colorectal Father         Colon Cancer late 70's     Circulatory Sister         CHF     Heart Disease Sister         defib placed     Neurologic Disorder Son         Multiple sclerosis     Cerebrovascular Disease Sister      Cancer - colorectal Other         niece in her 50's              Reviewed and updated as needed this visit by Provider  Tobacco  Allergies  Meds  Problems  Med Hx  Surg Hx  Fam Hx         Review of Systems   SEE HPI.      Objective    /88 (BP Location: Right arm, Patient Position: Chair, Cuff Size: Adult Large)   Pulse 69   Temp 98  F (36.7  C) (Oral)   Resp 18   LMP  (LMP Unknown)   SpO2 95%   There is no height or weight on file to calculate BMI.  Physical Exam   GENERAL: healthy, alert and no distress  RESP: lungs clear to auscultation - no rales, rhonchi or wheezes  CV: regular rate and rhythm, normal S1 S2, no S3 or S4, no murmur, click or rub, no peripheral edema and peripheral pulses strong  ABDOMEN: soft, nontender, no hepatosplenomegaly, no masses and bowel sounds normal  PSYCH: mentation appears normal, affect normal/bright    Diagnostic Test Results:  Labs reviewed in Epic        Assessment & Plan     1. Diarrhea, unspecified type  79 y.o. female, hx of c. Diff.  Here today with 5-6 days of loose/watery stools.  Discussed initial work up with labs in clinic and stool samples at home.  Will develop plan further after results reviewed.  Thus far she is tolerating  symptoms without problem.  Pt agrees with plan and verbalized understanding.  - Clostridium difficile Toxin B PCR; Future  - CBC with platelets and differential  - Comprehensive metabolic panel (BMP + Alb, Alk Phos, ALT, AST, Total. Bili, TP)  - Enteric Bacteria and Virus Panel by [facility] Stool; Future    2. History of Clostridium difficile infection  See above.  - Clostridium difficile Toxin B PCR; Future  - CBC with platelets and differential  - Comprehensive metabolic panel (BMP + Alb, Alk Phos, ALT, AST, Total. Bili, TP)  - Enteric Bacteria and Virus Panel by [facility] Stool; Future      Return in about 2 weeks (around 1/17/2020) for follow up.    JOSE RAMON Garrison [facility]

## 2020-01-03 NOTE — LETTER
January 3, 2020      Sommer Jaimes  [address] [place] [address]        Hi Sommer,     Your initial blood tests look good.  Nothing to explain the symptoms you are having.   Let's wait to see what the stool samples show and go from there.   Please let me know if you have any questions or concerns.         Resulted Orders   CBC with platelets and differential   Result Value Ref Range    WBC 6.9 4.0 - 11.0 10e9/L    RBC Count 4.61 3.8 - 5.2 10e12/L    Hemoglobin 14.4 11.7 - 15.7 g/dL    Hematocrit 43.5 35.0 - 47.0 %    MCV 94 78 - 100 fl    MCH 31.2 26.5 - 33.0 pg    MCHC 33.1 31.5 - 36.5 g/dL    RDW 12.6 10.0 - 15.0 %    Platelet Count 221 150 - 450 10e9/L    % Neutrophils 64.2 %    % Lymphocytes 23.0 %    % Monocytes 9.9 %    % Eosinophils 2.5 %    % Basophils 0.4 %    Absolute Neutrophil 4.4 1.6 - 8.3 10e9/L    Absolute Lymphocytes 1.6 0.8 - 5.3 10e9/L    Absolute Monocytes 0.7 0.0 - 1.3 10e9/L    Absolute Eosinophils 0.2 0.0 - 0.7 10e9/L    Absolute Basophils 0.0 0.0 - 0.2 10e9/L    Diff Method Automated Method    Comprehensive metabolic panel (BMP + Alb, Alk Phos, ALT, AST, Total. Bili, TP)   Result Value Ref Range    Sodium 140 133 - 144 mmol/L    Potassium 4.2 3.4 - 5.3 mmol/L    Chloride 108 94 - 109 mmol/L    Carbon Dioxide 27 20 - 32 mmol/L    Anion Gap 5 3 - 14 mmol/L    Glucose 102 (H) 70 - 99 mg/dL    Urea Nitrogen 21 7 - 30 mg/dL    Creatinine 0.96 0.52 - 1.04 mg/dL    GFR Estimate 56 (L) >60 mL/min/[1.73_m2]      Comment:      Non  GFR Calc  Starting 12/18/2018, serum creatinine based estimated GFR (eGFR) will be   calculated using the Chronic Kidney Disease Epidemiology Collaboration   (CKD-EPI) equation.      GFR Estimate If Black 65 >60 mL/min/[1.73_m2]      Comment:       GFR Calc  Starting 12/18/2018, serum creatinine based estimated GFR (eGFR) will be   calculated using the Chronic Kidney Disease Epidemiology Collaboration   (CKD-EPI) equation.       Calcium 9.2 8.5 - 10.1 mg/dL    Bilirubin Total 0.6 0.2 - 1.3 mg/dL    Albumin 3.5 3.4 - 5.0 g/dL    Protein Total 7.3 6.8 - 8.8 g/dL    Alkaline Phosphatase 80 40 - 150 U/L    ALT 30 0 - 50 U/L    AST 24 0 - 45 U/L       If you have any questions or concerns, please call the clinic at the number listed above.       Sincerely,        JOSE RAMON Garrison Ra CNP

## 2020-01-08 ENCOUNTER — TRANSFERRED RECORDS (OUTPATIENT)
Dept: HEALTH INFORMATION MANAGEMENT | Facility: CLINIC | Age: 80
End: 2020-01-08

## 2020-01-29 ENCOUNTER — OFFICE VISIT (OUTPATIENT)
Dept: FAMILY MEDICINE | Facility: CLINIC | Age: 80
End: 2020-01-29
Payer: COMMERCIAL

## 2020-01-29 VITALS
HEART RATE: 56 BPM | TEMPERATURE: 97.7 F | HEIGHT: 65 IN | SYSTOLIC BLOOD PRESSURE: 126 MMHG | RESPIRATION RATE: 16 BRPM | OXYGEN SATURATION: 96 % | DIASTOLIC BLOOD PRESSURE: 64 MMHG | BODY MASS INDEX: 29.17 KG/M2 | WEIGHT: 175.1 LBS

## 2020-01-29 DIAGNOSIS — Z00.00 MEDICARE ANNUAL WELLNESS VISIT, SUBSEQUENT: Primary | ICD-10-CM

## 2020-01-29 DIAGNOSIS — G25.2 INTENTION TREMOR: ICD-10-CM

## 2020-01-29 DIAGNOSIS — Z12.31 VISIT FOR SCREENING MAMMOGRAM: ICD-10-CM

## 2020-01-29 DIAGNOSIS — N18.30 CKD (CHRONIC KIDNEY DISEASE) STAGE 3, GFR 30-59 ML/MIN (H): ICD-10-CM

## 2020-01-29 DIAGNOSIS — Z13.6 CARDIOVASCULAR SCREENING; LDL GOAL LESS THAN 160: ICD-10-CM

## 2020-01-29 LAB
CHOLEST SERPL-MCNC: 218 MG/DL
HDLC SERPL-MCNC: 61 MG/DL
LDLC SERPL CALC-MCNC: 113 MG/DL
NONHDLC SERPL-MCNC: 157 MG/DL
TRIGL SERPL-MCNC: 222 MG/DL

## 2020-01-29 PROCEDURE — 99397 PER PM REEVAL EST PAT 65+ YR: CPT | Performed by: INTERNAL MEDICINE

## 2020-01-29 PROCEDURE — 36415 COLL VENOUS BLD VENIPUNCTURE: CPT | Performed by: INTERNAL MEDICINE

## 2020-01-29 PROCEDURE — 80061 LIPID PANEL: CPT | Performed by: INTERNAL MEDICINE

## 2020-01-29 RX ORDER — PROPRANOLOL HCL 60 MG
60 CAPSULE, EXTENDED RELEASE 24HR ORAL DAILY
Qty: 90 CAPSULE | Refills: 3 | Status: SHIPPED | OUTPATIENT
Start: 2020-01-29 | End: 2021-02-01

## 2020-01-29 ASSESSMENT — ENCOUNTER SYMPTOMS
SORE THROAT: 0
FREQUENCY: 0
NAUSEA: 0
ARTHRALGIAS: 0
WEAKNESS: 0
DIZZINESS: 0
DIARRHEA: 0
COUGH: 0
SHORTNESS OF BREATH: 0
EYE PAIN: 0
DYSURIA: 0
HEMATOCHEZIA: 0
HEADACHES: 0
BREAST MASS: 0
PARESTHESIAS: 0
ABDOMINAL PAIN: 0
MYALGIAS: 0
FEVER: 0
CONSTIPATION: 0
NERVOUS/ANXIOUS: 0
JOINT SWELLING: 1
HEMATURIA: 0
CHILLS: 0
HEARTBURN: 0
PALPITATIONS: 0

## 2020-01-29 ASSESSMENT — MIFFLIN-ST. JEOR: SCORE: 1265.13

## 2020-01-29 ASSESSMENT — ACTIVITIES OF DAILY LIVING (ADL): CURRENT_FUNCTION: NO ASSISTANCE NEEDED

## 2020-01-29 NOTE — PATIENT INSTRUCTIONS
Patient Education   Personalized Prevention Plan  You are due for the preventive services outlined below.  Your care team is available to assist you in scheduling these services.  If you have already completed any of these items, please share that information with your care team to update in your medical record.  Health Maintenance Due   Topic Date Due     Annual Wellness Visit  11/29/2019     FALL RISK ASSESSMENT  11/29/2019     Mammogram  12/11/2019       Understanding USDA MyPlate  The USDA (U.S. Department of Agriculture) has guidelines to help you make healthy food choices. These are called MyPlate. MyPlate shows the food groups that make up healthy meals using the image of a place setting. Before you eat, think about the healthiest choices for what to put onto your plate or into your cup or bowl. To learn more about building a healthy plate, visit www.Gamervisionplate.gov.    The food groups    Fruits. Any fruit or 100% fruit juice counts as part of the Fruit Group. Fruits may be fresh, canned, frozen, or dried, and may be whole, cut-up, or pureed. Make half your plate fruits and vegetables.    Vegetables. Any vegetable or 100% vegetable juice counts as a member of the Vegetable Group. Vegetables may be fresh, frozen, canned, or dried. They can be served raw or cooked and may be whole, cut-up, or mashed. Make half your plate fruits and vegetables.    Grains. All foods made from grains are part of the Grains Group. These include wheat, rice, oats, cornmeal, and barley such as bread, pasta, oatmeal, cereal, tortillas, and grits. Grains should be no more than a quarter of your plate. At least half of your grains should be whole grains.    Protein. This group includes meat, poultry, seafood, beans and peas, eggs, processed soy products (like tofu), nuts (including nut butters), and seeds. Make protein choices no more than a quarter of your plate. Meat and poultry choices should be lean or low fat.    Dairy. All  fluid milk products and foods made from milk that contain calcium, like yogurt and cheese, are part of the Dairy Group. (Foods that have little calcium, such as cream, butter, and cream cheese, are not part of the group.) Most dairy choices should be low-fat or fat-free.    Oils. These are fats that are liquid at room temperature. They include canola, corn, olive, soybean, and sunflower oil. Foods that are mainly oil include mayonnaise, certain salad dressings, and soft margarines. You should have only 5 to 7 teaspoons of oils a day. You probably already get this much from the food you eat.  Date Last Reviewed: 8/1/2017 2000-2019 [employer]. [address] [place] [address], [place] [address]. All rights reserved. This information is not intended as a substitute for professional medical care. Always follow your healthcare professional's instructions.

## 2020-01-29 NOTE — PROGRESS NOTES
"Chief Complaint   Patient presents with     Physical       SUBJECTIVE:   Sommer Jaimes is a 79 year old female who presents for Preventive Visit.    Are you in the first 12 months of your Medicare coverage?  No    Healthy Habits:     In general, how would you rate your overall health?  Good    Frequency of exercise:  4-5 days/week    Duration of exercise:  15-30 minutes    Do you usually eat at least 4 servings of fruit and vegetables a day, include whole grains    & fiber and avoid regularly eating high fat or \"junk\" foods?  No    Taking medications regularly:  Yes    Medication side effects:  None    Ability to successfully perform activities of daily living:  No assistance needed    Home Safety:  No safety concerns identified    Hearing Impairment:  No hearing concerns    In the past 6 months, have you been bothered by leaking of urine?  No    In general, how would you rate your overall mental or emotional health?  Good      PHQ-2 Total Score: 0    Additional concerns today:  No    Do you feel safe in your environment? Yes    Have you ever done Advance Care Planning? (For example, a Health Directive, POLST, or a discussion with a medical provider or your loved ones about your wishes): Yes, advance care planning is on file.      Fall risk  Fallen 2 or more times in the past year?: No  Any fall with injury in the past year?: No    Cognitive Screening   1) Repeat 3 items (Leader, Season, Table)    2) Clock draw: NORMAL  3) 3 item recall: Recalls 3 objects  Results: 3 items recalled: COGNITIVE IMPAIRMENT LESS LIKELY    Mini-CogTM Copyright SARAN Sharif. Licensed by the author for use in [facility]; reprinted with permission (mila@.[facility]). All rights reserved.      Do you have sleep apnea, excessive snoring or daytime drowsiness?: no    Reviewed and updated as needed this visit by clinical staff  Tobacco  Allergies  Meds  Med Hx  Surg Hx  Fam Hx  Soc Hx        Reviewed and updated as needed this " visit by Provider  Tobacco  Allergies  Meds  Problems  Med Hx  Surg Hx  Fam Hx        Social History     Tobacco Use     Smoking status: Never Smoker     Smokeless tobacco: Never Used   Substance Use Topics     Alcohol use: Yes     Alcohol/week: 0.0 standard drinks     Comment: very seldom     If you drink alcohol do you typically have >3 drinks per day or >7 drinks per week? No    Alcohol Use 1/29/2020   Prescreen: >3 drinks/day or >7 drinks/week? No   Prescreen: >3 drinks/day or >7 drinks/week? -         Current providers sharing in care for this patient include:   Patient Care Team:  Mera Gupta MD as PCP - General  Mera Gupta MD as Assigned PCP    The following health maintenance items are reviewed in Epic and correct as of today:  Health Maintenance   Topic Date Due     MEDICARE ANNUAL WELLNESS VISIT  11/29/2019     FALL RISK ASSESSMENT  11/29/2019     PHQ-2  01/01/2020     MAMMO SCREENING  12/11/2019     ADVANCE CARE PLANNING  10/30/2022     DTAP/TDAP/TD IMMUNIZATION (4 - Td) 03/06/2023     COLONOSCOPY  04/19/2023     LIPID  11/29/2023     DEXA  Completed     INFLUENZA VACCINE  Completed     PNEUMOCOCCAL IMMUNIZATION 65+ LOW/MEDIUM RISK  Completed     ZOSTER IMMUNIZATION  Completed     IPV IMMUNIZATION  Aged Out     MENINGITIS IMMUNIZATION  Aged Out     Labs reviewed in EPIC  BP Readings from Last 3 Encounters:   01/29/20 126/64   01/03/20 130/88   08/12/19 128/66    Wt Readings from Last 3 Encounters:   01/29/20 79.4 kg (175 lb 1.6 oz)   08/12/19 81.6 kg (180 lb)   11/29/18 82.9 kg (182 lb 11.2 oz)                  Patient Active Problem List   Diagnosis     Symptomatic menopausal or female climacteric states     Urethral fistula     Hematuria     CARDIOVASCULAR SCREENING; LDL GOAL LESS THAN 160     HYPERLIPIDEMIA LDL GOAL <160     Advanced directives, counseling/discussion     Anxiety     Presence of pessary     Palpitations     CKD (chronic kidney disease) stage 3, GFR 30-59  ml/min (H)     Past Surgical History:   Procedure Laterality Date     C APPENDECTOMY  1973    Appendectomy.     C NONSPECIFIC PROCEDURE      Sebaceous cyst.     C NONSPECIFIC PROCEDURE      Ganglion cyst.     C NONSPECIFIC PROCEDURE      Oral surg./Plastic surg. - chin.     COLONOSCOPY  4/19/2013    colonoscopy     COLONOSCOPY  4/19/2013    Procedure: COLONOSCOPY;  Colonoscopy ;  Surgeon: Harman Becerra MD;  Location:  GI     HC COLONOSCOPY THRU STOMA, DIAGNOSTIC  4/02    Dr. Tavarez, normal no polyps     HC FLEX SIGMOIDOSCOPY W/WO QUYNH SPEC BY BRUSH/WASH  1993    Flex sig. and  colonoscopy 2002     HC REMOVAL GALLBLADDER  1973    Cholecystectomy.     TONSILLECTOMY & ADENOIDECTOMY      T&A age 12       Social History     Tobacco Use     Smoking status: Never Smoker     Smokeless tobacco: Never Used   Substance Use Topics     Alcohol use: Yes     Alcohol/week: 0.0 standard drinks     Comment: very seldom     Family History   Problem Relation Age of Onset     Diabetes Mother      Heart Disease Mother      Cancer - colorectal Father         Colon Cancer late 70's     Circulatory Sister         CHF     Heart Disease Sister         defib placed     Neurologic Disorder Son         Multiple sclerosis     Cerebrovascular Disease Sister      Cancer - colorectal Other         niece in her 50's          Current Outpatient Medications   Medication Sig Dispense Refill     calcium carbonate-vitamin D (CALCIUM + D) 600-200 MG-UNIT TABS Take  by mouth.       Multiple Vitamins-Minerals (CENTRUM SILVER) per tablet Take 1 tablet by mouth daily.       propranolol ER (INDERAL LA) 60 MG 24 hr capsule Take 1 capsule (60 mg) by mouth daily 90 capsule 1     Allergies   Allergen Reactions     Erythromycin Anaphylaxis     severe reaction, rash and throat swelling.      Recent Labs   Lab Test 01/03/20  0948 11/29/18  1021 10/30/17  0935 10/19/16  0953  05/22/14  1140 03/06/13  1001   LDL  --  102* 102* 127*   < > 89 111   HDL  --  61 75 59  "  < > 60 60   TRIG  --  226* 176* 166*   < > 239* 178*   ALT 30 75* 51* 32   < > 29 25   CR 0.96 1.06* 0.91 0.97   < > 0.86 0.81   GFRESTIMATED 56* 50* 60* 56*   < > 65 69   GFRESTBLACK 65 61 72 68   < > 78 84   POTASSIUM 4.2 4.6 4.3 4.3   < > 4.2 4.1   TSH  --   --   --   --   --  2.42 2.35    < > = values in this interval not displayed.      Mammogram Screening: Patient over age 75, has elected to continue with mammography screening.    Review of Systems   Constitutional: Negative for chills and fever.   HENT: Negative for congestion, ear pain, hearing loss and sore throat.    Eyes: Negative for pain and visual disturbance.   Respiratory: Negative for cough and shortness of breath.    Cardiovascular: Negative for chest pain, palpitations and peripheral edema.   Gastrointestinal: Negative for abdominal pain, constipation, diarrhea, heartburn, hematochezia and nausea.   Breasts:  Negative for tenderness, breast mass and discharge.   Genitourinary: Negative for dysuria, frequency, genital sores, hematuria, pelvic pain, urgency, vaginal bleeding and vaginal discharge.   Musculoskeletal: Positive for joint swelling. Negative for arthralgias and myalgias.   Skin: Negative for rash.   Neurological: Negative for dizziness, weakness, headaches and paresthesias.   Psychiatric/Behavioral: Negative for mood changes. The patient is not nervous/anxious.    remains active;   She has stopped taking Sertraline and feels well.       OBJECTIVE:   /64   Pulse 56   Temp 97.7  F (36.5  C) (Oral)   Resp 16   Ht 1.651 m (5' 5\")   Wt 79.4 kg (175 lb 1.6 oz)   LMP  (LMP Unknown)   SpO2 96%   BMI 29.14 kg/m   Estimated body mass index is 29.14 kg/m  as calculated from the following:    Height as of this encounter: 1.651 m (5' 5\").    Weight as of this encounter: 79.4 kg (175 lb 1.6 oz).  Physical Exam  GENERAL: healthy, alert and no distress  EYES: Eyes grossly normal to inspection  HENT: ear canals and TM's normal, nose and " "mouth without ulcers or lesions  NECK: no adenopathy, no asymmetry, masses, or scars and thyroid normal to palpation  RESP: lungs clear to auscultation - no rales, rhonchi or wheezes  BREAST: normal without masses, tenderness or nipple discharge and no palpable axillary masses or adenopathy  CV: regular rates and rhythm, normal S1 S2, no S3 or S4, peripheral pulses strong and no peripheral edema  ABDOMEN: soft, nontender and bowel sounds normal  MS: extremities normal- no gross deformities noted; no joint effusions  NEURO: Normal strength and tone, sensory exam grossly normal, mentation intact and minimal tremor- more with intention; no resting tremor.   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 She is to be congratulated; services have been much appreciated.   Plan:     (G25.2) Intention tremor  Comment: L>R; intention tremor;  Propranolol started Fall 2017; writing OK, no Parkinson's; gait OK  Plan: propranolol ER (INDERAL LA) 60 MG 24 hr capsule          (Z13.6) CARDIOVASCULAR SCREENING; LDL GOAL LESS THAN 160  Comment: screening reviewed  Plan: Lipid panel reflex to direct LDL Fasting          (Z12.31) Visit for screening mammogram  Comment: Clinical Breast Exam OK  Plan: MA Screening Digital Bilateral          COUNSELING:  Reviewed preventive health counseling, as reflected in patient instructions       Regular exercise       Healthy diet/nutrition    Estimated body mass index is 29.14 kg/m  as calculated from the following:    Height as of this encounter: 1.651 m (5' 5\").    Weight as of this encounter: 79.4 kg (175 lb 1.6 oz).         reports that she has never smoked. She has never used smokeless tobacco.      Appropriate " preventive services were discussed with this patient, including applicable screening as appropriate for cardiovascular disease, diabetes, osteopenia/osteoporosis, and glaucoma.  As appropriate for age/gender, discussed screening for colorectal cancer, prostate cancer, breast cancer, and cervical cancer. Checklist reviewing preventive services available has been given to the patient.    Reviewed patients plan of care and provided an AVS. The Basic Care Plan (routine screening as documented in Health Maintenance) for Sommer meets the Care Plan requirement. This Care Plan has been established and reviewed with the Patient.    Counseling Resources:  ATP IV Guidelines  Pooled Cohorts Equation Calculator  Breast Cancer Risk Calculator  FRAX Risk Assessment  ICSI Preventive Guidelines  Dietary Guidelines for Americans, 2010  USDA's MyPlate  ASA Prophylaxis  Lung CA Screening    Mera Gupta MD  Internal Medicine   [facility]    Identified Health Risks:    The patient was counseled and encouraged to consider modifying their diet and eating habits. She was provided with information on recommended healthy diet options.

## 2020-01-29 NOTE — LETTER
February 3, 2020      Sommer Jaimes  [address] [place] [address]        Dear ,    We are writing to inform you of your test results.    Labs reviewed. Continue current meds and healthy lifestyle choices; diet and exercise.    Resulted Orders   Lipid panel reflex to direct LDL Fasting   Result Value Ref Range    Cholesterol 218 (H) <200 mg/dL      Comment:      Desirable:       <200 mg/dl    Triglycerides 222 (H) <150 mg/dL      Comment:      Borderline high:  150-199 mg/dl  High:             200-499 mg/dl  Very high:       >499 mg/dl  Fasting specimen      HDL Cholesterol 61 >49 mg/dL    LDL Cholesterol Calculated 113 (H) <100 mg/dL      Comment:      Above desirable:  100-129 mg/dl  Borderline High:  130-159 mg/dL  High:             160-189 mg/dL  Very high:       >189 mg/dl      Non HDL Cholesterol 157 (H) <130 mg/dL      Comment:      Above Desirable:  130-159 mg/dl  Borderline high:  160-189 mg/dl  High:             190-219 mg/dl  Very high:       >219 mg/dl         If you have any questions or concerns, please call the clinic at the number listed above.       Sincerely,        Mera Gupta MD/sb

## 2020-02-04 ENCOUNTER — HOSPITAL ENCOUNTER (OUTPATIENT)
Dept: MAMMOGRAPHY | Facility: CLINIC | Age: 80
Discharge: HOME | End: 2020-02-04
Attending: INTERNAL MEDICINE | Admitting: INTERNAL MEDICINE
Payer: COMMERCIAL

## 2020-02-04 DIAGNOSIS — Z12.31 VISIT FOR SCREENING MAMMOGRAM: ICD-10-CM

## 2020-02-04 PROCEDURE — 77063 BREAST TOMOSYNTHESIS BI: CPT

## 2020-02-19 ENCOUNTER — TELEPHONE (OUTPATIENT)
Dept: FAMILY MEDICINE | Facility: CLINIC | Age: 80
End: 2020-02-19

## 2020-02-19 ENCOUNTER — VIRTUAL VISIT (OUTPATIENT)
Dept: FAMILY MEDICINE | Facility: CLINIC | Age: 80
End: 2020-02-19
Payer: COMMERCIAL

## 2020-02-19 DIAGNOSIS — K58.0 IRRITABLE BOWEL SYNDROME WITH DIARRHEA: ICD-10-CM

## 2020-02-19 DIAGNOSIS — F41.9 ANXIETY: Primary | ICD-10-CM

## 2020-02-19 DIAGNOSIS — F43.0 STRESS REACTION: ICD-10-CM

## 2020-02-19 PROCEDURE — G2012 BRIEF CHECK IN BY MD/QHP: HCPCS | Performed by: INTERNAL MEDICINE

## 2020-02-19 RX ORDER — SERTRALINE HYDROCHLORIDE 25 MG/1
12.5 TABLET, FILM COATED ORAL DAILY
Qty: 30 TABLET | Refills: 3 | Status: SHIPPED | OUTPATIENT
Start: 2020-02-19 | End: 2021-02-11

## 2020-02-19 RX ORDER — AMITRIPTYLINE HYDROCHLORIDE 10 MG/1
TABLET ORAL
Qty: 90 TABLET | Refills: 1 | Status: SHIPPED | OUTPATIENT
Start: 2020-02-19 | End: 2020-08-03

## 2020-02-19 ASSESSMENT — PATIENT HEALTH QUESTIONNAIRE - PHQ9
SUM OF ALL RESPONSES TO PHQ QUESTIONS 1-9: 3
5. POOR APPETITE OR OVEREATING: NOT AT ALL

## 2020-02-19 ASSESSMENT — ANXIETY QUESTIONNAIRES
1. FEELING NERVOUS, ANXIOUS, OR ON EDGE: SEVERAL DAYS
GAD7 TOTAL SCORE: 3
7. FEELING AFRAID AS IF SOMETHING AWFUL MIGHT HAPPEN: NOT AT ALL
3. WORRYING TOO MUCH ABOUT DIFFERENT THINGS: NOT AT ALL
2. NOT BEING ABLE TO STOP OR CONTROL WORRYING: MORE THAN HALF THE DAYS
6. BECOMING EASILY ANNOYED OR IRRITABLE: NOT AT ALL
IF YOU CHECKED OFF ANY PROBLEMS ON THIS QUESTIONNAIRE, HOW DIFFICULT HAVE THESE PROBLEMS MADE IT FOR YOU TO DO YOUR WORK, TAKE CARE OF THINGS AT HOME, OR GET ALONG WITH OTHER PEOPLE: NOT DIFFICULT AT ALL
5. BEING SO RESTLESS THAT IT IS HARD TO SIT STILL: NOT AT ALL

## 2020-02-19 NOTE — PROGRESS NOTES
"  Phone visit:  Sommer Jaimes is a 79 year old female who is being evaluated via a billable telephone visit.      The patient has been notified of following:     \"This telephone visit will be conducted via a call between you and your physician/provider. We have found that certain health care needs can be provided without the need for a physical exam.  This service lets us provide the care you need with a short phone conversation.  If a prescription is necessary we can send it directly to your pharmacy.  If lab work is needed we can place an order for that and you can then stop by our lab to have the test done at a later time.    If during the course of the call the physician/provider feels a telephone visit is not appropriate, you will not be charged for this service.\"     Consent has been obtained for this service by 1 care team member: yes. See the scanned image in the medical record.    Sommer Jaimes complains of    Chief Complaint   Patient presents with     Diarrhea     nervous stomach     increased stress with family issues        Current Outpatient Medications   Medication Sig Dispense Refill     calcium carbonate-vitamin D (CALCIUM + D) 600-200 MG-UNIT TABS Take  by mouth.       Multiple Vitamins-Minerals (CENTRUM SILVER) per tablet Take 1 tablet by mouth daily.       propranolol ER (INDERAL LA) 60 MG 24 hr capsule Take 1 capsule (60 mg) by mouth daily 90 capsule 3        I have reviewed and updated the patient's Past Medical History, Social History, Family History and Medication List.    ALLERGIES  Erythromycin    PRISCA Ruiz LPN      Additional provider notes:   Family stressors.   Pt acknowledges feeling anxious and worse with GI symptoms.       Pt states has had a nervous stomach for the past 2 1/2 weeks.  Very slight nausea at times but frequent loose stools.Has tried Immodium AD with short term relief of symptoms.   ( state found and old rx for Diphenox-Atropine 2.5 mg take 1-2 tablets prn.) " Believes this has worked in the past when she had stress in her life that caused stomach upset and diarrhea.    Family issues with her sister that are causing extreme stress.     FYI: pt states stopped taking her Zoloft in mid January. Symptoms started just before that.   PHQ and SARAY completed.     Medication reviewed; no hx of amitriptyline use.      IBS- Irritable Bowel Syndrome.   Nervous stomach  Limit Lomotil use ( had on hand from another time)  Recommend trial of Amitriptyline 10 mg at HS, may increase to 2 per day ( divided doses), max dose Amitriptyline 10 mg in AM and 20 mg in PM.     OK to use Imodium AS NEEDED  Consider resuming Sertraline ( has 11 on hand, pt checked.)    Anxiety  Can manifest as IBS; discussed treatment as outlined above.     Mera Gupta MD  Internal Medicine  electronically signed  14 minutes

## 2020-02-20 ASSESSMENT — ANXIETY QUESTIONNAIRES: GAD7 TOTAL SCORE: 3

## 2020-08-27 ENCOUNTER — TRANSFERRED RECORDS (OUTPATIENT)
Dept: HEALTH INFORMATION MANAGEMENT | Facility: CLINIC | Age: 80
End: 2020-08-27

## 2020-10-22 ENCOUNTER — OFFICE VISIT (OUTPATIENT)
Dept: FAMILY MEDICINE | Facility: CLINIC | Age: 80
End: 2020-10-22
Payer: COMMERCIAL

## 2020-10-22 VITALS
TEMPERATURE: 98.2 F | WEIGHT: 176 LBS | BODY MASS INDEX: 29.29 KG/M2 | RESPIRATION RATE: 22 BRPM | HEART RATE: 74 BPM | SYSTOLIC BLOOD PRESSURE: 130 MMHG | DIASTOLIC BLOOD PRESSURE: 82 MMHG | OXYGEN SATURATION: 98 %

## 2020-10-22 DIAGNOSIS — H25.013 CORTICAL AGE-RELATED CATARACT OF BOTH EYES: ICD-10-CM

## 2020-10-22 DIAGNOSIS — F41.9 ANXIETY: ICD-10-CM

## 2020-10-22 DIAGNOSIS — L25.3 LATEX ALLERGY, CONTACT DERMATITIS: ICD-10-CM

## 2020-10-22 DIAGNOSIS — Z01.818 PREOP GENERAL PHYSICAL EXAM: Primary | ICD-10-CM

## 2020-10-22 DIAGNOSIS — K58.0 IRRITABLE BOWEL SYNDROME WITH DIARRHEA: ICD-10-CM

## 2020-10-22 PROCEDURE — 99214 OFFICE O/P EST MOD 30 MIN: CPT | Performed by: INTERNAL MEDICINE

## 2020-10-22 RX ORDER — KETOROLAC TROMETHAMINE 5 MG/ML
SOLUTION OPHTHALMIC
COMMUNITY
Start: 2020-10-12 | End: 2021-02-11

## 2020-10-22 RX ORDER — PREDNISOLONE ACETATE 10 MG/ML
SUSPENSION/ DROPS OPHTHALMIC
COMMUNITY
Start: 2020-10-12 | End: 2021-02-11

## 2020-10-22 RX ORDER — AMITRIPTYLINE HYDROCHLORIDE 10 MG/1
TABLET ORAL
Qty: 90 TABLET | Refills: 3 | Status: SHIPPED | OUTPATIENT
Start: 2020-10-22 | End: 2021-02-11

## 2020-10-22 NOTE — PATIENT INSTRUCTIONS

## 2020-10-22 NOTE — PROGRESS NOTES
[facility]  [address] [facility] [address]  Phone: [phone]    Primary Provider: Washington Martinez  Pre-op Performing Provider: WASHINGTON MARTINEZ    PREOPERATIVE EVALUATION:  Today's date: 10/22/2020    Sommer Jaimes is a 79 year old female who presents for a preoperative evaluation.    Surgical Information:  Surgery/Procedure: Cataract Surgery-Both Eyes  Surgery Location: [facility]  Surgeon: Dr. Josh Pathak  Surgery Date: 10/28/2020 (left eye) 11/11/20 (right eye)  Time of Surgery: TBD  Where patient plans to recover: At home alone  Fax number for surgical facility: [phone]    Type of Anesthesia Anticipated: Local    Subjective     HPI related to upcoming procedure: Pt advised via My Chart.    Preop Questions 10/22/2020   1. Have you ever had a heart attack or stroke? No   2. Have you ever had surgery on your heart or blood vessels, such as a stent placement, a coronary artery bypass, or surgery on an artery in your head, neck, heart, or legs? No   3. Do you have chest pain with activity? No   4. Do you have a history of  heart failure? No   5. Do you currently have a cold, bronchitis or symptoms of other infection? No   6. Do you have a cough, shortness of breath, or wheezing? No   7. Do you or anyone in your family have previous history of blood clots? No   8. Do you or does anyone in your family have a serious bleeding problem such as prolonged bleeding following surgeries or cuts? No   9. Have you ever had problems with anemia or been told to take iron pills? No   10. Have you had any abnormal blood loss such as black, tarry or bloody stools, or abnormal vaginal bleeding? No   11. Have you ever had a blood transfusion? No   12. Are you willing to have a blood transfusion if it is medically needed before, during, or after your surgery? Yes   13. Have you or any of your relatives ever had problems with anesthesia? No   14. Do you have  sleep apnea, excessive snoring or daytime drowsiness? No   15. Do you have any artifical heart valves or other implanted medical devices like a pacemaker, defibrillator, or continuous glucose monitor? No   16. Do you have artificial joints? No   17. Are you allergic to latex? YES: latex free environment       Health Care Directive:  Patient has a Health Care Directive on file      Preoperative Review of :   reviewed - no concerns      Status of Chronic Conditions:      Review of Systems  CONSTITUTIONAL: NEGATIVE for fever, chills, change in weight  EYES: vision changes associated with cataracts  RESP: NEGATIVE for significant cough or SOB  CV: occasionally noting palpitations; denies chest pain or shortness of breath   GI: NEGATIVE for nausea, abdominal pain, heartburn, or change in bowel habits  MUSCULOSKELETAL: NEGATIVE for significant arthralgias or myalgia  NEURO: NEGATIVE for weakness, dizziness or paresthesias  ENDOCRINE: NEGATIVE for temperature intolerance, skin/hair changes  PSYCHIATRIC: anxiety     Patient Active Problem List    Diagnosis Date Noted     CKD (chronic kidney disease) stage 3, GFR 30-59 ml/min 10/07/2019     Priority: Medium     Palpitations 08/12/2019     Priority: Medium     Presence of pessary 11/29/2018     Priority: Medium     Dr Mantilla; he places it and it remains in place for 3 months at a time; last there about 6 weeks ago.        Anxiety 04/08/2014     Priority: Medium     control URI symptpms should lessen anxiety; follow up if anxiety persists       Advanced directives, counseling/discussion 02/29/2012     Priority: Medium     Advance Directive Problem List Overview:   Name Relationship Phone    Primary Health Care Agent            Alternative Health Care Agent          Discussed advance care planning with patient; information given to patient to review. 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 Hormonal?     Disorder of bone and cartilage, unspecified 1/29/01    Lumbar -1.67  Femoral neck  -1.69     Pneumonia, organism [phone]    Out  pt. Rx.     Symptomatic menopausal or female climacteric states     Menopause, age 50. 0-HRT.     Urethral fistula 11/23/98    Microscopic hematuria, past urol. evaluation includes - IVP, 0-cyst, \"Kink in ureter.\"     Past Surgical History:   Procedure Laterality Date     C APPENDECTOMY  1973    Appendectomy.     COLONOSCOPY  4/19/2013    colonoscopy     COLONOSCOPY  4/19/2013    Procedure: COLONOSCOPY;  Colonoscopy ;  Surgeon: Harman Becerra MD;  Location:  GI     HC COLONOSCOPY THRU STOMA, DIAGNOSTIC  4/02    Dr. Tavarez, normal no polyps     HC FLEX SIGMOIDOSCOPY W/WO QUYNH SPEC BY BRUSH/WASH  1993    Flex sig. and  colonoscopy 2002     HC REMOVAL GALLBLADDER  1973    Cholecystectomy.     TONSILLECTOMY & ADENOIDECTOMY      T&A age 12     [facility] NONSPECIFIC PROCEDURE      Sebaceous cyst.     [facility] NONSPECIFIC PROCEDURE      Ganglion cyst.     [facility] NONSPECIFIC PROCEDURE      Oral surg./Plastic surg. - chin.     Current Outpatient Medications   Medication Sig Dispense Refill     amitriptyline (ELAVIL) 10 MG tablet START WITH ONE TABLET BY MOUTH AT BEDTIME MAY INCREASE TO TWO TABLETS BY MOUTH PER DAY 90 tablet 3     calcium carbonate-vitamin D (CALCIUM + D) 600-200 MG-UNIT TABS Take  by mouth.       ketorolac (ACULAR) 0.5 % ophthalmic solution        Multiple Vitamins-Minerals (CENTRUM SILVER) per tablet Take 1 tablet by mouth daily.       " prednisoLONE acetate (PRED FORTE) 1 % ophthalmic suspension        propranolol ER (INDERAL LA) 60 MG 24 hr capsule Take 1 capsule (60 mg) by mouth daily 90 capsule 3     sertraline 25 MG PO tablet Take 0.5 tablets (12.5 mg) by mouth daily After 2-3 weeks may increase to 25 mg per day; Profile Rx: patient will contact pharmacy when needed (Patient not taking: Reported on 10/22/2020) 30 tablet 3       Allergies   Allergen Reactions     Erythromycin Anaphylaxis     severe reaction, rash and throat swelling.         Social History     Tobacco Use     Smoking status: Never Smoker     Smokeless tobacco: Never Used   Substance Use Topics     Alcohol use: Yes     Alcohol/week: 0.0 standard drinks     Comment: very seldom     Family History   Problem Relation Age of Onset     Diabetes Mother      Heart Disease Mother      Cancer - colorectal Father         Colon Cancer late 70's     Circulatory Sister         CHF     Heart Disease Sister         defib placed     Neurologic Disorder Son         Multiple sclerosis     Cerebrovascular Disease Sister      Cancer - colorectal Other         niece in her 50's      History   Drug Use No         Objective     /82 (BP Location: Right arm, Patient Position: Chair, Cuff Size: Adult Large)   Pulse 74   Temp 98.2  F (36.8  C) (Oral)   Resp 22   Wt 79.8 kg (176 lb)   LMP  (LMP Unknown)   SpO2 98%   BMI 29.29 kg/m      Physical Exam    GENERAL APPEARANCE: healthy, alert and no distress     EYES: EOMI, PERRL     HENT: ear canals and TM's normal and nose and mouth without ulcers or lesions     NECK: no adenopathy, no asymmetry, masses, or scars and thyroid normal to palpation     RESP: lungs clear to auscultation - no rales, rhonchi or wheezes     CV: regular rates and rhythm, normal S1 S2, no S3 or S4 and no murmur, click or rub     ABDOMEN:  soft, nontender, no HSM or masses and bowel sounds normal     MS: extremities normal- no gross deformities noted, no evidence of  "inflammation in joints, FROM in all extremities.     NEURO: Normal strength and tone, sensory exam grossly normal, mentation intact and speech normal     PSYCH: mentation appears normal. and affect normal/bright    Recent Labs   Lab Test 01/03/20  0948 11/29/18  1021   HGB 14.4  --      --     138   POTASSIUM 4.2 4.6   CR 0.96 1.06*        Diagnostics:     No EKG required for low risk surgery (cataract, skin procedure, breast biopsy, etc).    Revised Cardiac Risk Index (RCRI):  The patient has the following serious cardiovascular risks for perioperative complications:   - No serious cardiac risks = 0 points     RCRI Interpretation: 0 points: Class I (very low risk - 0.4% complication rate)       Assessment & Plan   The proposed surgical procedure is considered LOW risk.    Assessment & Plan     (Z01.818) Preop general physical exam  (primary encounter diagnosis)  Comment: bilateral cataracts  Plan: surgery as scheduled    (H25.013) Cortical age-related cataract of both eyes  Comment: bilateral cataracts; vision changes  Plan:     (K58.0) Irritable bowel syndrome with diarrhea  Comment: Elavil has been effective  Plan: amitriptyline (ELAVIL) 10 MG tablet          (F41.9) Anxiety  Comment: AS NEEDED Propranolol;   Plan:         BMI:   Estimated body mass index is 29.29 kg/m  as calculated from the following:    Height as of 1/29/20: 1.651 m (5' 5\").    Weight as of this encounter: 79.8 kg (176 lb).              No follow-ups on file.    Mera Gupta MD  Internal Medicine   [facility]       Risks and Recommendations:  The patient has the following additional risks and recommendations for perioperative complications:      Medication Instructions:  Latex Free environment recommended    --Approval given to proceed with proposed procedure, without further diagnostic evaluation  --meds reviewed; may hold meds on AM of surgery. Resume all meds within 24 hours of surgery  --Pain " medications, time off from work and FMLA following surgery deferred to surgeon.      RECOMMENDATION:  APPROVAL GIVEN to proceed with proposed procedure, without further diagnostic evaluation.    Signed Electronically by: MD Mera Dent MD  Internal Medicine  electronically signed     Copy of this evaluation report is provided to requesting physician.    Preop [facility] Preop Guidelines    Revised Cardiac Risk Index

## 2021-01-28 ENCOUNTER — IMMUNIZATION (OUTPATIENT)
Dept: NURSING | Facility: CLINIC | Age: 81
End: 2021-01-28
Payer: COMMERCIAL

## 2021-01-28 PROCEDURE — 91300 PR COVID VAC PFIZER DIL RECON 30 MCG/0.3 ML IM: CPT

## 2021-01-28 PROCEDURE — 0001A PR COVID VAC PFIZER DIL RECON 30 MCG/0.3 ML IM: CPT

## 2021-02-01 DIAGNOSIS — G25.2 INTENTION TREMOR: ICD-10-CM

## 2021-02-01 RX ORDER — PROPRANOLOL HCL 60 MG
CAPSULE, EXTENDED RELEASE 24HR ORAL
Qty: 90 CAPSULE | Refills: 0 | Status: SHIPPED | OUTPATIENT
Start: 2021-02-01 | End: 2021-02-11

## 2021-02-01 NOTE — TELEPHONE ENCOUNTER
Prescription approved per [facility] Refill Protocol.  Latrice [facility] RN

## 2021-02-11 ENCOUNTER — ANCILLARY PROCEDURE (OUTPATIENT)
Dept: GENERAL RADIOLOGY | Facility: CLINIC | Age: 81
End: 2021-02-11
Attending: INTERNAL MEDICINE
Payer: COMMERCIAL

## 2021-02-11 ENCOUNTER — OFFICE VISIT (OUTPATIENT)
Dept: FAMILY MEDICINE | Facility: CLINIC | Age: 81
End: 2021-02-11
Payer: COMMERCIAL

## 2021-02-11 VITALS
HEIGHT: 65 IN | OXYGEN SATURATION: 97 % | TEMPERATURE: 97.8 F | RESPIRATION RATE: 17 BRPM | BODY MASS INDEX: 29.71 KG/M2 | HEART RATE: 64 BPM | SYSTOLIC BLOOD PRESSURE: 118 MMHG | DIASTOLIC BLOOD PRESSURE: 64 MMHG | WEIGHT: 178.3 LBS

## 2021-02-11 DIAGNOSIS — Z12.31 VISIT FOR SCREENING MAMMOGRAM: ICD-10-CM

## 2021-02-11 DIAGNOSIS — Z13.6 CARDIOVASCULAR SCREENING; LDL GOAL LESS THAN 160: ICD-10-CM

## 2021-02-11 DIAGNOSIS — Z00.00 ENCOUNTER FOR MEDICARE ANNUAL WELLNESS EXAM: Primary | ICD-10-CM

## 2021-02-11 DIAGNOSIS — I49.3 VENTRICULAR ECTOPY: ICD-10-CM

## 2021-02-11 DIAGNOSIS — G25.2 INTENTION TREMOR: ICD-10-CM

## 2021-02-11 DIAGNOSIS — M25.512 ACUTE PAIN OF LEFT SHOULDER: ICD-10-CM

## 2021-02-11 DIAGNOSIS — K58.0 IRRITABLE BOWEL SYNDROME WITH DIARRHEA: ICD-10-CM

## 2021-02-11 LAB
ALBUMIN SERPL-MCNC: 3.9 G/DL (ref 3.4–5)
ALP SERPL-CCNC: 89 U/L (ref 40–150)
ALT SERPL W P-5'-P-CCNC: 32 U/L (ref 0–50)
ANION GAP SERPL CALCULATED.3IONS-SCNC: 5 MMOL/L (ref 3–14)
AST SERPL W P-5'-P-CCNC: 24 U/L (ref 0–45)
BILIRUB SERPL-MCNC: 0.6 MG/DL (ref 0.2–1.3)
BUN SERPL-MCNC: 28 MG/DL (ref 7–30)
CALCIUM SERPL-MCNC: 10.1 MG/DL (ref 8.5–10.1)
CHLORIDE SERPL-SCNC: 105 MMOL/L (ref 94–109)
CHOLEST SERPL-MCNC: 216 MG/DL
CO2 SERPL-SCNC: 28 MMOL/L (ref 20–32)
CREAT SERPL-MCNC: 1.05 MG/DL (ref 0.52–1.04)
GFR SERPL CREATININE-BSD FRML MDRD: 50 ML/MIN/{1.73_M2}
GLUCOSE SERPL-MCNC: 106 MG/DL (ref 70–99)
HDLC SERPL-MCNC: 66 MG/DL
LDLC SERPL CALC-MCNC: 114 MG/DL
NONHDLC SERPL-MCNC: 150 MG/DL
POTASSIUM SERPL-SCNC: 4.8 MMOL/L (ref 3.4–5.3)
PROT SERPL-MCNC: 8.1 G/DL (ref 6.8–8.8)
SODIUM SERPL-SCNC: 138 MMOL/L (ref 133–144)
TRIGL SERPL-MCNC: 180 MG/DL

## 2021-02-11 PROCEDURE — 36415 COLL VENOUS BLD VENIPUNCTURE: CPT | Performed by: INTERNAL MEDICINE

## 2021-02-11 PROCEDURE — 93000 ELECTROCARDIOGRAM COMPLETE: CPT | Performed by: INTERNAL MEDICINE

## 2021-02-11 PROCEDURE — 99397 PER PM REEVAL EST PAT 65+ YR: CPT | Performed by: INTERNAL MEDICINE

## 2021-02-11 PROCEDURE — 80053 COMPREHEN METABOLIC PANEL: CPT | Performed by: INTERNAL MEDICINE

## 2021-02-11 PROCEDURE — 99213 OFFICE O/P EST LOW 20 MIN: CPT | Mod: 25 | Performed by: INTERNAL MEDICINE

## 2021-02-11 PROCEDURE — 80061 LIPID PANEL: CPT | Performed by: INTERNAL MEDICINE

## 2021-02-11 PROCEDURE — 73030 X-RAY EXAM OF SHOULDER: CPT | Mod: LT | Performed by: RADIOLOGY

## 2021-02-11 RX ORDER — PROPRANOLOL HCL 60 MG
60 CAPSULE, EXTENDED RELEASE 24HR ORAL DAILY
Qty: 90 CAPSULE | Refills: 4 | Status: SHIPPED | OUTPATIENT
Start: 2021-02-11 | End: 2022-05-25

## 2021-02-11 RX ORDER — AMITRIPTYLINE HYDROCHLORIDE 10 MG/1
10 TABLET ORAL AT BEDTIME
Qty: 90 TABLET | Refills: 4 | Status: SHIPPED | OUTPATIENT
Start: 2021-02-11 | End: 2022-04-11

## 2021-02-11 ASSESSMENT — ENCOUNTER SYMPTOMS: BREAST MASS: 0

## 2021-02-11 ASSESSMENT — ACTIVITIES OF DAILY LIVING (ADL): CURRENT_FUNCTION: NO ASSISTANCE NEEDED

## 2021-02-11 ASSESSMENT — MIFFLIN-ST. JEOR: SCORE: 1278.6

## 2021-02-11 NOTE — PATIENT INSTRUCTIONS
Patient Education   Personalized Prevention Plan  You are due for the preventive services outlined below.  Your care team is available to assist you in scheduling these services.  If you have already completed any of these items, please share that information with your care team to update in your medical record.  Health Maintenance Due   Topic Date Due     ANNUAL REVIEW OF HM ORDERS  1940     Annual Wellness Visit  01/29/2021     Mammogram  02/04/2021     COVID-19 Vaccine (2 of 2 - Pfizer series) 02/18/2021     FALL RISK ASSESSMENT  02/19/2021       Preventing Falls at Home  A person can fall for many reasons. Older adults may fall because reaction time slows down as we age. Your muscles and joints may get stiff, weak, or less flexible because of illness, medicines, or a physical condition.   Other health problems that make falls more likely include:     Arthritis    Dizziness or lightheadedness when you stand up (orthostatic hypotension)    History of a stroke    Dizziness    Anemia    Certain medicines taken for mental illness or to control blood pressure.    Problems with balance or gait    Bladder or urinary problems    History of falling    Changes in vision (vision impairment)    Changes in thinking skills and memory (cognitive impairment)  Injuries from a fall can include serious injuries such as broken bones, dislocated joints, internal bleeding and cuts. Injuries like these can limit your independence.   Prevention tips  To help prevent falls and fall-related injuries, follow the tips below.    Floors  To make floors safer:     Put nonskid pads under area rugs.    Remove small rugs.    Replace worn floor coverings.    Tack carpets firmly to each step on carpeted stairs. Put nonskid strips on the edges of uncarpeted stairs.    Keep floors and stairs free of clutter and cords.    Arrange furniture so there are clear pathways.    Clean up any spills right away.  Bathrooms    To make bathrooms  safer:     Install grab bars in the tub or shower.    Apply nonskid strips or put a nonskid rubber mat in the tub or shower.    Sit on a bath chair to bathe.    Use bathmats with nonskid backing.  Lighting  To improve visibility in your home:      Keep a flashlight in each room. Or put a lamp next to the bed within easy reach.    Put nightlights in the bedrooms, hallways, kitchen, and bathrooms.    Make sure all stairways have good lighting.    Take your time when going up and down stairs.    Put handrails on both sides of stairs and in walkways for more support. To prevent injury to your wrist or arm, don t use handrails to pull yourself up.    Install grab bars to pull yourself up.    Move or rearrange items that you use often. This will make them easier to find or reach.    Look at your home to find any safety hazards. Especially look at doorways, walkways, and the driveway. Remove or repair any safety problems that you find.  Other changes to make    Look around to find any safety hazards. Look closely at doorways, walkways, and the driveway. Remove or repair any safety problems that you find.    Wear shoes that fit well.    Take your time when going up and down stairs.    Put handrails on both sides of stairs and in walkways for more support. To prevent injury to your wrist or arm, don t use handrails to pull yourself up.    Install grab bars wherever needed to pull yourself up.    Arrange items that you use often. This will make them easier to find or reach.    [employer] last reviewed this educational content on 3/1/2020    7463-1451 [employer]. [address], [place] [address]. All rights reserved. This information is not intended as a substitute for professional medical care. Always follow your healthcare professional's instructions.

## 2021-02-11 NOTE — PROGRESS NOTES
"Chief Complaint   Patient presents with     Wellness Visit     PT FASTING      Fall     fell while putting something in her closet and hit her left arm from the elbow to the shoulder.  some decreased mobility and discomfort.      Recheck Medication       SUBJECTIVE:   Sommer Jaimes is a 80 year old female who presents for Preventive Visit.      Patient has been advised of split billing requirements and indicates understanding: Yes   Are you in the first 12 months of your Medicare coverage?  No    Fall    Healthy Habits:     In general, how would you rate your overall health?  Good    Frequency of exercise:  2-3 days/week    Duration of exercise:  15-30 minutes    Do you usually eat at least 4 servings of fruit and vegetables a day, include whole grains    & fiber and avoid regularly eating high fat or \"junk\" foods?  No    Taking medications regularly:  Yes    Medication side effects:  None    Ability to successfully perform activities of daily living:  No assistance needed    Home Safety:  No safety concerns identified    Hearing Impairment:  No hearing concerns    In the past 6 months, have you been bothered by leaking of urine?  No    In general, how would you rate your overall mental or emotional health?  Good      PHQ-2 Total Score: 0    Additional concerns today:  Yes    Do you feel safe in your environment? Yes    Have you ever done Advance Care Planning? (For example, a Health Directive, POLST, or a discussion with a medical provider or your loved ones about your wishes): Yes, advance care planning is on file.       Fall risk  Fallen 2 or more times in the past year?: No  Any fall with injury in the past year?: Yes  Timed Up and Go Test (>13.5 is fall risk; contact physician) : (pt was standing but had no trouble walking)    Cognitive Screening   1) Repeat 3 items (Leader, Season, Table)    2) Clock draw: NORMAL  3) 3 item recall: Recalls 3 objects  Results: 3 items recalled: COGNITIVE IMPAIRMENT LESS " LIKELY    Mini-CogTM Copyright SARAN Sharif. Licensed by the author for use in [facility]; reprinted with permission (mila@[facility]). All rights reserved.      Do you have sleep apnea, excessive snoring or daytime drowsiness?: no    Reviewed and updated as needed this visit by clinical staff  Tobacco  Allergies  Meds   Med Hx  Surg Hx  Fam Hx  Soc Hx        Reviewed and updated as needed this visit by Provider  Tobacco  Allergies  Meds  Problems  Med Hx  Surg Hx  Fam Hx         Social History     Tobacco Use     Smoking status: Never Smoker     Smokeless tobacco: Never Used   Substance Use Topics     Alcohol use: Yes     Alcohol/week: 0.0 standard drinks     Comment: very seldom         Alcohol Use 2/11/2021   Prescreen: >3 drinks/day or >7 drinks/week? No   Prescreen: >3 drinks/day or >7 drinks/week? -           Medication Followup of Propranolol ER      Taking Medication as prescribed: yes    Side Effects:  None    Medication Helping Symptoms:  yes       Current providers sharing in care for this patient include:   Patient Care Team:  Mera Gupta MD as PCP - General  Mera Gupta MD as Assigned PCP    The following health maintenance items are reviewed in Epic and correct as of today:  Health Maintenance   Topic Date Due     ANNUAL REVIEW OF HM ORDERS  1940     MEDICARE ANNUAL WELLNESS VISIT  01/29/2021     MAMMO SCREENING  02/04/2021     COVID-19 Vaccine (2 of 2 - Pfizer series) 02/18/2021     FALL RISK ASSESSMENT  02/19/2021     DTAP/TDAP/TD IMMUNIZATION (4 - Td) 03/06/2023     COLORECTAL CANCER SCREENING  04/19/2023     ADVANCE CARE PLANNING  01/29/2025     DEXA  04/12/2028     PHQ-2  Completed     INFLUENZA VACCINE  Completed     Pneumococcal Vaccine: 65+ Years  Completed     ZOSTER IMMUNIZATION  Completed     Pneumococcal Vaccine: Pediatrics (0 to 5 Years) and At-Risk Patients (6 to 64 Years)  Aged Out     IPV IMMUNIZATION  Aged Out     MENINGITIS IMMUNIZATION   Aged Out     HEPATITIS B IMMUNIZATION  Aged Out     Labs reviewed in EPIC  BP Readings from Last 3 Encounters:   02/11/21 118/64   10/22/20 130/82   01/29/20 126/64    Wt Readings from Last 3 Encounters:   02/11/21 80.9 kg (178 lb 4.8 oz)   10/22/20 79.8 kg (176 lb)   01/29/20 79.4 kg (175 lb 1.6 oz)                  Patient Active Problem List   Diagnosis     Symptomatic menopausal or female climacteric states     Urethral fistula     Hematuria     CARDIOVASCULAR SCREENING; LDL GOAL LESS THAN 160     HYPERLIPIDEMIA LDL GOAL <160     Advanced directives, counseling/discussion     Anxiety     Presence of pessary     Palpitations     CKD (chronic kidney disease) stage 3, GFR 30-59 ml/min     Past Surgical History:   Procedure Laterality Date     C APPENDECTOMY  1973    Appendectomy.     COLONOSCOPY  4/19/2013    colonoscopy     COLONOSCOPY  4/19/2013    Procedure: COLONOSCOPY;  Colonoscopy ;  Surgeon: Harman Becerra MD;  Location:  GI     HC COLONOSCOPY THRU STOMA, DIAGNOSTIC  4/02    Dr. Tavarez, normal no polyps     HC FLEX SIGMOIDOSCOPY W/WO QUYNH SPEC BY BRUSH/WASH  1993    Flex sig. and  colonoscopy 2002     HC REMOVAL GALLBLADDER  1973    Cholecystectomy.     TONSILLECTOMY & ADENOIDECTOMY      T&A age 12     Z NONSPECIFIC PROCEDURE      Sebaceous cyst.     [facility] NONSPECIFIC PROCEDURE      Ganglion cyst.     [facility] NONSPECIFIC PROCEDURE      Oral surg./Plastic surg. - chin.       Social History     Tobacco Use     Smoking status: Never Smoker     Smokeless tobacco: Never Used   Substance Use Topics     Alcohol use: Yes     Alcohol/week: 0.0 standard drinks     Comment: very seldom     Family History   Problem Relation Age of Onset     Diabetes Mother      Heart Disease Mother      Cancer - colorectal Father         Colon Cancer late 70's     Circulatory Sister         CHF     Heart Disease Sister         defib placed     Neurologic Disorder Son         Multiple sclerosis     Cerebrovascular Disease Sister       "Cancer - colorectal Other         niece in her 50's          Current Outpatient Medications   Medication Sig Dispense Refill     amitriptyline (ELAVIL) 10 MG tablet START WITH ONE TABLET BY MOUTH AT BEDTIME MAY INCREASE TO TWO TABLETS BY MOUTH PER DAY 90 tablet 3     calcium carbonate-vitamin D (CALCIUM + D) 600-200 MG-UNIT TABS Take  by mouth.       Multiple Vitamins-Minerals (CENTRUM SILVER) per tablet Take 1 tablet by mouth daily.       propranolol ER (INDERAL LA) 60 MG 24 hr capsule TAKE ONE CAPSULE BY MOUTH ONCE DAILY 90 capsule 0     Allergies   Allergen Reactions     Erythromycin Anaphylaxis     severe reaction, rash and throat swelling.      Recent Labs   Lab Test 01/29/20  1014 01/03/20  0948 11/29/18  1021 10/30/17  0935 05/22/14  1140 05/22/14  1140 03/06/13  1001   *  --  102* 102*   < > 89 111   HDL 61  --  61 75   < > 60 60   TRIG 222*  --  226* 176*   < > 239* 178*   ALT  --  30 75* 51*   < > 29 25   CR  --  0.96 1.06* 0.91   < > 0.86 0.81   GFRESTIMATED  --  56* 50* 60*   < > 65 69   GFRESTBLACK  --  65 61 72   < > 78 84   POTASSIUM  --  4.2 4.6 4.3   < > 4.2 4.1   TSH  --   --   --   --   --  2.42 2.35    < > = values in this interval not displayed.      Mammogram Screening - Patient over age 75, has elected to continue with screening.    Review of Systems   Breasts:  Negative for tenderness, breast mass and discharge.   Genitourinary: Negative for pelvic pain, vaginal bleeding and vaginal discharge.   Musculoskeletal:        Fall off stool while getting something in closet. Fell backwards; abrasion on back ; left shoulder with decreased ROM           OBJECTIVE:   /64   Pulse 64   Temp 97.8  F (36.6  C) (Oral)   Resp 17   Ht 1.657 m (5' 5.25\")   Wt 80.9 kg (178 lb 4.8 oz)   LMP  (LMP Unknown)   SpO2 97%   BMI 29.44 kg/m   Estimated body mass index is 29.44 kg/m  as calculated from the following:    Height as of this encounter: 1.657 m (5' 5.25\").    Weight as of this encounter: " 80.9 kg (178 lb 4.8 oz).  Physical Exam  GENERAL: healthy, alert and no distress  EYES: Eyes grossly normal to inspection  HENT: ear canals and TM's normal, nose and mouth without ulcers or lesions  NECK: no adenopathy, no asymmetry, masses, or scars and thyroid normal to palpation  RESP: lungs clear to auscultation - no rales, rhonchi or wheezes  CV: regular rates and rhythm, normal S1 S2, no S3 or S4, peripheral pulses strong and no peripheral edema  ABDOMEN: soft, nontender, no hepatosplenomegaly, no masses and bowel sounds normal  MS: left arm with decreased flexion ( <60 degrees); no visual bony deformity;   NEURO: Normal strength and tone, sensory exam grossly normal, mentation intact, gait normal including heel/toe/tandem walking, Romberg normal and FNF with bilateral intention tremor  PSYCH: mentation appears normal, affect normal/bright    Diagnostic Test Results:  Labs reviewed in Epic  Left shoulder xray: independent reviewed with pt at appt; no fracture;     EKG; sinus rhythm RAte 61 ( on BB)    ASSESSMENT / PLAN:   (Z00.00) Encounter for Medicare annual wellness exam  (primary encounter diagnosis)  Comment: HEALTH CARE MAINTENANCE reviewed; immunizations reviewed - she has had Covid Vaccination #1 and scheduled for #2  Plan:     (Z12.31) Visit for screening mammogram  Comment:   Plan: MA Screening Digital Bilateral          (M25.512) Acute pain of left shoulder  Comment: fall backwards in closet 3 weeks ago;  No radicular pain; decreased ROM; at risk for adhesive capsulitis, due to trauma, XRay is warranted  Plan: XR Shoulder Left G/E 3 Views        Consider physical therapy    (I49.3) Ventricular ectopy  Comment: she is on BB, HR OK  Plan: EKG 12-lead complete w/read - Clinics             Patient has been advised of split billing requirements and indicates understanding: Yes  COUNSELING:  Reviewed preventive health counseling, as reflected in patient instructions       Regular exercise       Healthy  "diet/nutrition       Immunizations    Scheduled for Covid #2          Estimated body mass index is 29.44 kg/m  as calculated from the following:    Height as of this encounter: 1.657 m (5' 5.25\").    Weight as of this encounter: 80.9 kg (178 lb 4.8 oz).        She reports that she has never smoked. She has never used smokeless tobacco.      Appropriate preventive services were discussed with this patient, including applicable screening as appropriate for cardiovascular disease, diabetes, osteopenia/osteoporosis, and glaucoma.  As appropriate for age/gender, discussed screening for colorectal cancer, prostate cancer, breast cancer, and cervical cancer. Checklist reviewing preventive services available has been given to the patient.    Reviewed patients plan of care and provided an AVS. The Complex Care Plan (for patients with higher acuity and needing more deliberate coordination of services) for Sommer meets the Care Plan requirement. This Care Plan has been established and reviewed with the Patient.    Counseling Resources:  ATP IV Guidelines  Pooled Cohorts Equation Calculator  Breast Cancer Risk Calculator  Breast Cancer: Medication to Reduce Risk  FRAX Risk Assessment  ICSI Preventive Guidelines  Dietary Guidelines for Americans, 2010  USDA's MyPlate  ASA Prophylaxis  Lung CA Screening    Mera Gupta MD  Internal Medicine   [facility]    Identified Health Risks:  "

## 2021-02-18 ENCOUNTER — IMMUNIZATION (OUTPATIENT)
Dept: PEDIATRICS | Facility: CLINIC | Age: 81
End: 2021-02-18
Attending: INTERNAL MEDICINE
Payer: COMMERCIAL

## 2021-02-18 PROCEDURE — 91300 PR COVID VAC PFIZER DIL RECON 30 MCG/0.3 ML IM: CPT

## 2021-02-18 PROCEDURE — 0002A PR COVID VAC PFIZER DIL RECON 30 MCG/0.3 ML IM: CPT

## 2021-03-22 ENCOUNTER — HOSPITAL ENCOUNTER (OUTPATIENT)
Dept: MAMMOGRAPHY | Facility: CLINIC | Age: 81
Discharge: HOME | End: 2021-03-22
Attending: INTERNAL MEDICINE | Admitting: INTERNAL MEDICINE
Payer: COMMERCIAL

## 2021-03-22 DIAGNOSIS — Z12.31 VISIT FOR SCREENING MAMMOGRAM: ICD-10-CM

## 2021-03-22 PROCEDURE — 77063 BREAST TOMOSYNTHESIS BI: CPT

## 2021-06-21 ENCOUNTER — VIRTUAL VISIT (OUTPATIENT)
Dept: FAMILY MEDICINE | Facility: CLINIC | Age: 81
End: 2021-06-21
Payer: COMMERCIAL

## 2021-06-21 DIAGNOSIS — R82.90 NONSPECIFIC FINDING ON EXAMINATION OF URINE: Primary | ICD-10-CM

## 2021-06-21 DIAGNOSIS — R39.89 SENSATION OF PRESSURE IN BLADDER AREA: ICD-10-CM

## 2021-06-21 DIAGNOSIS — N30.01 ACUTE CYSTITIS WITH HEMATURIA: Primary | ICD-10-CM

## 2021-06-21 LAB
ALBUMIN UR-MCNC: 100 MG/DL
APPEARANCE UR: ABNORMAL
BACTERIA #/AREA URNS HPF: ABNORMAL /HPF
BILIRUB UR QL STRIP: NEGATIVE
COLOR UR AUTO: YELLOW
GLUCOSE UR STRIP-MCNC: NEGATIVE MG/DL
HGB UR QL STRIP: ABNORMAL
KETONES UR STRIP-MCNC: NEGATIVE MG/DL
LEUKOCYTE ESTERASE UR QL STRIP: ABNORMAL
NITRATE UR QL: POSITIVE
NON-SQ EPI CELLS #/AREA URNS LPF: ABNORMAL /LPF
PH UR STRIP: 6 PH (ref 5–7)
RBC #/AREA URNS AUTO: ABNORMAL /HPF
SOURCE: ABNORMAL
SP GR UR STRIP: 1.02 (ref 1–1.03)
URNS CMNT MICRO: ABNORMAL
UROBILINOGEN UR STRIP-ACNC: 0.2 EU/DL (ref 0.2–1)
WBC #/AREA URNS AUTO: >100 /HPF

## 2021-06-21 PROCEDURE — 99213 OFFICE O/P EST LOW 20 MIN: CPT | Mod: 95 | Performed by: NURSE PRACTITIONER

## 2021-06-21 PROCEDURE — 87086 URINE CULTURE/COLONY COUNT: CPT | Performed by: NURSE PRACTITIONER

## 2021-06-21 PROCEDURE — 81001 URINALYSIS AUTO W/SCOPE: CPT | Performed by: NURSE PRACTITIONER

## 2021-06-21 PROCEDURE — 87186 SC STD MICRODIL/AGAR DIL: CPT | Performed by: NURSE PRACTITIONER

## 2021-06-21 PROCEDURE — 87088 URINE BACTERIA CULTURE: CPT | Performed by: NURSE PRACTITIONER

## 2021-06-21 RX ORDER — CEPHALEXIN 500 MG/1
500 CAPSULE ORAL 2 TIMES DAILY
Qty: 14 CAPSULE | Refills: 0 | Status: SHIPPED | OUTPATIENT
Start: 2021-06-21 | End: 2022-05-25

## 2021-06-21 NOTE — PROGRESS NOTES
Sommer is a 80 year old who is being evaluated via a billable telephone visit.      What phone number would you like to be contacted at? [phone]  How would you like to obtain your AVS? MyChart    Assessment & Plan     UTI  Tolerating symptoms without problem.  Start abx.  Recheck urine in 1 month.  Pt agrees with plan and verbalized understanding.  - *UA reflex to Microscopic and Culture ([place] [facility] [place] [facility] (except [place] and [place]); Future  - keflex      No follow-ups on file.    Tiny De La Torre, JOSE RAMON CNP  [facility] [place]    Subjective   Sommer is a 80 year old who presents for the following health issues     HPI     Genitourinary - Female  Onset/Duration: x Saturday   Description:   Painful urination (Dysuria): YES           Frequency: YES  Blood in urine (Hematuria): no  Delay in urine (Hesitency): no  Intensity: mild to moderate   Progression of Symptoms:  same  Accompanying Signs & Symptoms:  Fever/chills: no  Flank pain: no  Nausea and vomiting: no  Vaginal symptoms: discharge due to pessary   Abdominal/Pelvic Pain: YES- lower abdomen   History:   History of frequent UTI s: YES- since using the Pessary- about 7+ years   History of kidney stones: no  Sexually Active: no  Possibility of pregnancy: No  Precipitating or alleviating factors: None  Therapies tried and outcome:none     Symptoms day 3.  No blood in the urine, fever, nausea.  Normal intake.  Has pressure over the bladder.  She has not had a UTI in several years.    Review of Systems   Constitutional, HEENT, cardiovascular, pulmonary, gi and gu systems are negative, except as otherwise noted.      Objective           Vitals:  No vitals were obtained today due to virtual visit.    Physical Exam   healthy, alert and no distress  PSYCH: Alert and oriented times 3; coherent speech, normal   rate and volume, able to articulate logical thoughts, able   to abstract reason, no tangential thoughts, no hallucinations   or  delusions  Her affect is normal  RESP: No cough, no audible wheezing, able to talk in full sentences  Remainder of exam unable to be completed due to telephone visits    No results found for this or any previous visit (from the past 24 hour(s)).            Phone call duration: 5 minutes

## 2021-06-22 LAB
BACTERIA SPEC CULT: ABNORMAL
SPECIMEN SOURCE: ABNORMAL

## 2021-07-21 ENCOUNTER — LAB (OUTPATIENT)
Dept: LAB | Facility: CLINIC | Age: 81
End: 2021-07-21
Payer: COMMERCIAL

## 2021-07-21 DIAGNOSIS — N30.01 ACUTE CYSTITIS WITH HEMATURIA: ICD-10-CM

## 2021-07-21 LAB
ALBUMIN UR-MCNC: NEGATIVE MG/DL
APPEARANCE UR: ABNORMAL
BACTERIA #/AREA URNS HPF: ABNORMAL /HPF
BILIRUB UR QL STRIP: NEGATIVE
COLOR UR AUTO: YELLOW
GLUCOSE UR STRIP-MCNC: NEGATIVE MG/DL
HGB UR QL STRIP: ABNORMAL
KETONES UR STRIP-MCNC: NEGATIVE MG/DL
LEUKOCYTE ESTERASE UR QL STRIP: ABNORMAL
NITRATE UR QL: NEGATIVE
PH UR STRIP: 5.5 [PH] (ref 5–7)
RBC #/AREA URNS AUTO: ABNORMAL /HPF
SP GR UR STRIP: >=1.03 (ref 1–1.03)
UROBILINOGEN UR STRIP-ACNC: 0.2 E.U./DL
WBC #/AREA URNS AUTO: ABNORMAL /HPF

## 2021-07-21 PROCEDURE — 87086 URINE CULTURE/COLONY COUNT: CPT

## 2021-07-21 PROCEDURE — 81001 URINALYSIS AUTO W/SCOPE: CPT

## 2021-07-23 LAB — BACTERIA UR CULT: NO GROWTH

## 2021-10-23 ENCOUNTER — HEALTH MAINTENANCE LETTER (OUTPATIENT)
Age: 81
End: 2021-10-23

## 2022-04-08 DIAGNOSIS — K58.0 IRRITABLE BOWEL SYNDROME WITH DIARRHEA: ICD-10-CM

## 2022-04-09 ENCOUNTER — HEALTH MAINTENANCE LETTER (OUTPATIENT)
Age: 82
End: 2022-04-09

## 2022-04-11 RX ORDER — AMITRIPTYLINE HYDROCHLORIDE 10 MG/1
TABLET ORAL
Qty: 90 TABLET | Refills: 0 | Status: SHIPPED | OUTPATIENT
Start: 2022-04-11 | End: 2022-05-25

## 2022-04-11 NOTE — TELEPHONE ENCOUNTER
Routing request to provider for review/approval because:  Labs not current: BP    Patient has upcoming appointment 5/25/22. RN will issue one time 90 day westley refill. Please advise on labs.   Jacky HALEY RN

## 2022-05-25 ENCOUNTER — OFFICE VISIT (OUTPATIENT)
Dept: FAMILY MEDICINE | Facility: CLINIC | Age: 82
End: 2022-05-25
Payer: COMMERCIAL

## 2022-05-25 VITALS
HEART RATE: 61 BPM | WEIGHT: 174.6 LBS | RESPIRATION RATE: 16 BRPM | DIASTOLIC BLOOD PRESSURE: 68 MMHG | BODY MASS INDEX: 28.06 KG/M2 | SYSTOLIC BLOOD PRESSURE: 134 MMHG | OXYGEN SATURATION: 97 % | HEIGHT: 66 IN | TEMPERATURE: 98 F

## 2022-05-25 DIAGNOSIS — Z12.31 VISIT FOR SCREENING MAMMOGRAM: ICD-10-CM

## 2022-05-25 DIAGNOSIS — N18.31 STAGE 3A CHRONIC KIDNEY DISEASE (H): ICD-10-CM

## 2022-05-25 DIAGNOSIS — Z00.00 ENCOUNTER FOR MEDICARE ANNUAL WELLNESS EXAM: Primary | ICD-10-CM

## 2022-05-25 DIAGNOSIS — R73.9 BLOOD GLUCOSE ELEVATED: ICD-10-CM

## 2022-05-25 DIAGNOSIS — H61.21 IMPACTED CERUMEN OF RIGHT EAR: ICD-10-CM

## 2022-05-25 DIAGNOSIS — G25.2 INTENTION TREMOR: ICD-10-CM

## 2022-05-25 DIAGNOSIS — K58.0 IRRITABLE BOWEL SYNDROME WITH DIARRHEA: ICD-10-CM

## 2022-05-25 DIAGNOSIS — Z13.6 CARDIOVASCULAR SCREENING; LDL GOAL LESS THAN 160: ICD-10-CM

## 2022-05-25 LAB
HBA1C MFR BLD: 5.4 % (ref 0–5.6)
HGB BLD-MCNC: 15.2 G/DL (ref 11.7–15.7)

## 2022-05-25 PROCEDURE — 99214 OFFICE O/P EST MOD 30 MIN: CPT | Mod: 25 | Performed by: INTERNAL MEDICINE

## 2022-05-25 PROCEDURE — 83036 HEMOGLOBIN GLYCOSYLATED A1C: CPT | Performed by: INTERNAL MEDICINE

## 2022-05-25 PROCEDURE — 80053 COMPREHEN METABOLIC PANEL: CPT | Performed by: INTERNAL MEDICINE

## 2022-05-25 PROCEDURE — 80061 LIPID PANEL: CPT | Performed by: INTERNAL MEDICINE

## 2022-05-25 PROCEDURE — 99397 PER PM REEVAL EST PAT 65+ YR: CPT | Mod: 25 | Performed by: INTERNAL MEDICINE

## 2022-05-25 PROCEDURE — 85018 HEMOGLOBIN: CPT | Performed by: INTERNAL MEDICINE

## 2022-05-25 PROCEDURE — 82043 UR ALBUMIN QUANTITATIVE: CPT | Performed by: INTERNAL MEDICINE

## 2022-05-25 PROCEDURE — 36415 COLL VENOUS BLD VENIPUNCTURE: CPT | Performed by: INTERNAL MEDICINE

## 2022-05-25 PROCEDURE — 69210 REMOVE IMPACTED EAR WAX UNI: CPT | Mod: RT | Performed by: INTERNAL MEDICINE

## 2022-05-25 RX ORDER — PROPRANOLOL HYDROCHLORIDE 80 MG/1
80 CAPSULE, EXTENDED RELEASE ORAL DAILY
Qty: 90 CAPSULE | Refills: 3 | Status: SHIPPED | OUTPATIENT
Start: 2022-05-25 | End: 2023-05-04

## 2022-05-25 RX ORDER — PROPRANOLOL HCL 60 MG
60 CAPSULE, EXTENDED RELEASE 24HR ORAL DAILY
Qty: 90 CAPSULE | Refills: 4 | Status: CANCELLED | OUTPATIENT
Start: 2022-05-25

## 2022-05-25 RX ORDER — AMITRIPTYLINE HYDROCHLORIDE 10 MG/1
TABLET ORAL
Qty: 90 TABLET | Refills: 3 | Status: SHIPPED | OUTPATIENT
Start: 2022-05-25 | End: 2023-06-21

## 2022-05-25 ASSESSMENT — ENCOUNTER SYMPTOMS
PALPITATIONS: 0
CHILLS: 0
JOINT SWELLING: 1
CONSTIPATION: 0
FEVER: 0
ABDOMINAL PAIN: 0
NERVOUS/ANXIOUS: 1
MYALGIAS: 0
COUGH: 0
BREAST MASS: 0
NAUSEA: 0
HEARTBURN: 0
WEAKNESS: 0
DYSURIA: 0
HEADACHES: 0
FREQUENCY: 0
DIARRHEA: 0
ARTHRALGIAS: 1
EYE PAIN: 0
HEMATURIA: 0
SHORTNESS OF BREATH: 1
HEMATOCHEZIA: 0
SORE THROAT: 0
PARESTHESIAS: 0
DIZZINESS: 0

## 2022-05-25 ASSESSMENT — PAIN SCALES - GENERAL: PAINLEVEL: NO PAIN (0)

## 2022-05-25 ASSESSMENT — ACTIVITIES OF DAILY LIVING (ADL): CURRENT_FUNCTION: NO ASSISTANCE NEEDED

## 2022-05-25 NOTE — PROGRESS NOTES
"Chief Complaint   Patient presents with     Wellness Visit     Ear Problem     Feels like her ears are plugged        SUBJECTIVE:   Sommer Jaimes is a 81 year old female who presents for Preventive Visit.      Patient has been advised of split billing requirements and indicates understanding: Yes  Are you in the first 12 months of your Medicare coverage?  No    Healthy Habits:     In general, how would you rate your overall health?  Good    Frequency of exercise:  2-3 days/week    Duration of exercise:  15-30 minutes    Do you usually eat at least 4 servings of fruit and vegetables a day, include whole grains    & fiber and avoid regularly eating high fat or \"junk\" foods?  Yes    Medication side effects:  No muscle aches    Ability to successfully perform activities of daily living:  No assistance needed    Home Safety:  No safety concerns identified    Hearing Impairment:  Difficulty understanding soft or whispered speech    In the past 6 months, have you been bothered by leaking of urine?  No    In general, how would you rate your overall mental or emotional health?  Good      PHQ-2 Total Score: 1    Additional concerns today:  No    Do you feel safe in your environment? Yes    Have you ever done Advance Care Planning? (For example, a Health Directive, POLST, or a discussion with a medical provider or your loved ones about your wishes): Yes, advance care planning is on file.       Fall risk  Fallen 2 or more times in the past year?: No  Any fall with injury in the past year?: No    Cognitive Screening   1) Repeat 3 items (Leader, Season, Table)    2) Clock draw: NORMAL  3) 3 item recall: Recalls 3 objects  Results: 3 items recalled: COGNITIVE IMPAIRMENT LESS LIKELY    Mini-CogTM Copyright SARAN Sharif. Licensed by the author for use in [facility]; reprinted with permission (mila@.[facility]). All rights reserved.      Do you have sleep apnea, excessive snoring or daytime drowsiness?: no    Reviewed and " updated as needed this visit by clinical staff   Tobacco  Allergies  Meds   Med Hx  Surg Hx  Fam Hx  Soc Hx          Reviewed and updated as needed this visit by Provider   Tobacco  Allergies  Meds  Problems  Med Hx  Surg Hx  Fam Hx           Social History     Tobacco Use     Smoking status: Never Smoker     Smokeless tobacco: Never Used   Substance Use Topics     Alcohol use: Yes     Alcohol/week: 0.0 standard drinks     Comment: very seldom         Alcohol Use 5/25/2022   Prescreen: >3 drinks/day or >7 drinks/week? No   Prescreen: >3 drinks/day or >7 drinks/week? -           Medication Followup of Propranolol    Taking Medication as prescribed: yes    Side Effects:  None    Medication Helping Symptoms:  yes       Current providers sharing in care for this patient include:   Patient Care Team:  Mera Gupta MD as PCP - General  Mera Gupta MD as Assigned PCP    The following health maintenance items are reviewed in Epic and correct as of today:  Health Maintenance Due   Topic Date Due     MICROALBUMIN  Never done     HEMOGLOBIN  01/03/2021     BMP  02/11/2022     LIPID  02/11/2022     COVID-19 Vaccine (4 - Booster for Pfizer series) 02/28/2022     MAMMO SCREENING  03/22/2022     ANNUAL REVIEW OF HM ORDERS  06/21/2022     Labs reviewed in EPIC  BP Readings from Last 3 Encounters:   05/25/22 134/68   02/11/21 118/64   10/22/20 130/82    Wt Readings from Last 3 Encounters:   05/25/22 79.2 kg (174 lb 9.6 oz)   02/11/21 80.9 kg (178 lb 4.8 oz)   10/22/20 79.8 kg (176 lb)                  Patient Active Problem List   Diagnosis     Symptomatic menopausal or female climacteric states     Urethral fistula     Hematuria     CARDIOVASCULAR SCREENING; LDL GOAL LESS THAN 160     HYPERLIPIDEMIA LDL GOAL <160     Advanced directives, counseling/discussion     Anxiety     Presence of pessary     Palpitations     CKD (chronic kidney disease) stage 3, GFR 30-59 ml/min (H)     Past Surgical History:    Procedure Laterality Date     COLONOSCOPY  4/19/2013    colonoscopy     COLONOSCOPY  4/19/2013    Procedure: COLONOSCOPY;  Colonoscopy ;  Surgeon: Harman Becerra MD;  Location:  GI     [facility] FLEX SIGMOIDOSCOPY W/WO QUYNH SPEC BY BRUSH/WASH  1993    Flex sig. and  colonoscopy 2002     [facility] REMOVAL GALLBLADDER  1973    Cholecystectomy.     TONSILLECTOMY & ADENOIDECTOMY      T&A age 12     [facility] APPENDECTOMY  1973    Appendectomy.     [facility] NONSPECIFIC PROCEDURE      Sebaceous cyst.     [facility] NONSPECIFIC PROCEDURE      Ganglion cyst.     [facility] NONSPECIFIC PROCEDURE      Oral surg./Plastic surg. - chin.     [facility] COLONOSCOPY THRU STOMA, DIAGNOSTIC  4/02    Dr. Tavarez, normal no polyps       Social History     Tobacco Use     Smoking status: Never Smoker     Smokeless tobacco: Never Used   Substance Use Topics     Alcohol use: Yes     Alcohol/week: 0.0 standard drinks     Comment: very seldom     Family History   Problem Relation Age of Onset     Diabetes Mother      Heart Disease Mother      Cancer - colorectal Father         Colon Cancer late 70's     Circulatory Sister         CHF     Heart Disease Sister         defib placed     Neurologic Disorder Son         Multiple sclerosis     Cerebrovascular Disease Sister      Cancer - colorectal Other         niece in her 50's          Current Outpatient Medications   Medication Sig Dispense Refill     amitriptyline (ELAVIL) 10 MG tablet TAKE ONE TABLET BY MOUTH AT BEDTIME 90 tablet 3     calcium carbonate-vitamin D (CALCIUM + D) 600-200 MG-UNIT TABS Take  by mouth.       Multiple Vitamins-Minerals (CENTRUM SILVER) per tablet Take 1 tablet by mouth daily.       propranolol ER (INDERAL LA) 80 MG 24 hr capsule Take 1 capsule (80 mg) by mouth daily 90 capsule 3     Allergies   Allergen Reactions     Erythromycin Anaphylaxis     severe reaction, rash and throat swelling.      Recent Labs   Lab Test 02/11/21  1009 01/29/20  1014 01/03/20  0948 11/29/18  1021 05/24/15  1451 05/22/14  1140  "  * 113*  --  102*   < > 89   HDL 66 61  --  61   < > 60   TRIG 180* 222*  --  226*   < > 239*   ALT 32  --  30 75*   < > 29   CR 1.05*  --  0.96 1.06*   < > 0.86   GFRESTIMATED 50*  --  56* 50*   < > 65   GFRESTBLACK 58*  --  65 61   < > 78   POTASSIUM 4.8  --  4.2 4.6   < > 4.2   TSH  --   --   --   --   --  2.42    < > = values in this interval not displayed.        Mammogram Screening - Patient over age 75, has elected to continue with screening.  Pertinent mammograms are reviewed under the imaging tab.    Review of Systems   Constitutional: Negative for chills and fever.   HENT: Negative for congestion, ear pain, hearing loss and sore throat.    Eyes: Negative for pain and visual disturbance.   Respiratory: Positive for shortness of breath. Negative for cough.    Cardiovascular: Negative for chest pain, palpitations and peripheral edema.   Gastrointestinal: Negative for abdominal pain, constipation, diarrhea, heartburn, hematochezia and nausea.   Breasts:  Negative for tenderness, breast mass and discharge.   Genitourinary: Positive for vaginal discharge. Negative for dysuria, frequency, genital sores, hematuria, pelvic pain, urgency and vaginal bleeding.   Musculoskeletal: Positive for arthralgias and joint swelling. Negative for myalgias.   Skin: Negative for rash.   Neurological: Negative for dizziness, weakness, headaches and paresthesias.   Psychiatric/Behavioral: Negative for mood changes. The patient is nervous/anxious.        OBJECTIVE:   /68   Pulse 61   Temp 98  F (36.7  C) (Oral)   Resp 16   Ht 1.676 m (5' 6\")   Wt 79.2 kg (174 lb 9.6 oz)   LMP  (LMP Unknown)   SpO2 97%   BMI 28.18 kg/m   Estimated body mass index is 28.18 kg/m  as calculated from the following:    Height as of this encounter: 1.676 m (5' 6\").    Weight as of this encounter: 79.2 kg (174 lb 9.6 oz).  Physical Exam  GENERAL: healthy, alert and no distress  EYES: Eyes grossly normal to inspection  HENT: normal " "cephalic/atraumatic, right ear: occluded with cerumen, nose and mouth without ulcers or lesions, oropharynx clear and oral mucous membranes moist  NECK: no adenopathy, no asymmetry, masses, or scars and thyroid normal to palpation  RESP: lungs clear to auscultation - no rales, rhonchi or wheezes  CV: regular rate and rhythm, normal S1 S2, no S3 or S4, no murmur, click or rub, no peripheral edema and peripheral pulses strong  ABDOMEN: soft, nontender, no hepatosplenomegaly, no masses and bowel sounds normal  MS: no gross musculoskeletal defects noted, no edema  NEURO: Normal strength and tone, sensory exam grossly normal, mentation intact, gait normal including heel/toe/tandem walking and Romberg slightly off balance ( has hx of \"issues with crystals) and cerumen obstruction on the Right . Tremor a bit more prominent.   PSYCH: mentation appears normal, affect normal/bright    Diagnostic Test Results:  Labs reviewed in Epic              ASSESSMENT / PLAN:   (Z00.00) Encounter for Medicare annual wellness exam  (primary encounter diagnosis)  Comment: HEALTH CARE MAINTENANCE   Plan: age out colonoscopy ; desires jolie; labs for kidneys, screening lytes, labs, etc.    (G25.2) Intention tremor  Comment: L>R; intention tremor;  Propranolol started Fall 2017; writing OK, no Parkinson's; gait OK; she is interested in increased dose. watch HR  Increase from 60 mg to 80 mgs. montior HR, etc.  Plan: propranolol ER (INDERAL LA) 80 MG 24 hr capsule          (K58.0) Irritable bowel syndrome with diarrhea  Comment: IBS; improved with meds.  Plan: amitriptyline (ELAVIL) 10 MG tablet          (Z12.31) Visit for screening mammogram  Comment: Clinical Breast Exam   Plan: MA SCREENING DIGITAL BILAT - Future  (s+30)          (R73.9) Blood glucose elevated  Comment:   Plan: Comprehensive metabolic panel, Hemoglobin A1c        Monitor Glucose, A1C;  Keep active, exercise ref    (Z13.6) CARDIOVASCULAR SCREENING; LDL GOAL LESS THAN " "160  Comment:   Plan: Lipid panel reflex to direct LDL Fasting,         Comprehensive metabolic panel          (N18.31) Stage 3a chronic kidney disease (H)  Comment: monitor renal function  Plan: Comprehensive metabolic panel, Albumin Random         Urine Quantitative with Creat Ratio, Hemoglobin          (H61.21) Impacted cerumen of right ear  Comment: failed manual extraction with curette. ; irrigation recommended  Plan: REMOVE IMPACTED CERUMEN            Patient has been advised of split billing requirements and indicates understanding: Yes    COUNSELING:  Reviewed preventive health counseling, as reflected in patient instructions       Regular exercise       Healthy diet/nutrition       Vision screening    Estimated body mass index is 28.18 kg/m  as calculated from the following:    Height as of this encounter: 1.676 m (5' 6\").    Weight as of this encounter: 79.2 kg (174 lb 9.6 oz).        She reports that she has never smoked. She has never used smokeless tobacco.      Appropriate preventive services were discussed with this patient, including applicable screening as appropriate for cardiovascular disease, diabetes, osteopenia/osteoporosis, and glaucoma.  As appropriate for age/gender, discussed screening for colorectal cancer, prostate cancer, breast cancer, and cervical cancer. Checklist reviewing preventive services available has been given to the patient.    Reviewed patients plan of care and provided an AVS. The Complex Care Plan (for patients with higher acuity and needing more deliberate coordination of services) for Sommer meets the Care Plan requirement. This Care Plan has been established and reviewed with the Patient.    Counseling Resources:  ATP IV Guidelines  Pooled Cohorts Equation Calculator  Breast Cancer Risk Calculator  Breast Cancer: Medication to Reduce Risk  FRAX Risk Assessment  ICSI Preventive Guidelines  Dietary Guidelines for Americans, 2010  USDA's MyPlate  ASA Prophylaxis  Lung CA " Screening    Mera Gupta MD  Internal Medicine   [facility]    30 minutes in addition to HEALTH CARE MAINTENANCE are spent with patient, over 50% of that time spent providing counselling, discussing and reviewing medical conditions/concerns, meds and potential side effects.         Identified Health Risks:

## 2022-05-25 NOTE — PATIENT INSTRUCTIONS
Patient Education   Personalized Prevention Plan  You are due for the preventive services outlined below.  Your care team is available to assist you in scheduling these services.  If you have already completed any of these items, please share that information with your care team to update in your medical record.  Health Maintenance Due   Topic Date Due     Kidney Microalbumin Urine Test  Never done     Hemoglobin  01/03/2021     Annual Wellness Visit  02/11/2022     Basic Metabolic Panel  02/11/2022     Cholesterol Lab  02/11/2022     COVID-19 Vaccine (4 - Booster for Pfizer series) 02/28/2022     Mammogram  03/22/2022     ANNUAL REVIEW OF HM ORDERS  06/21/2022

## 2022-05-26 LAB
ALBUMIN SERPL-MCNC: 4.1 G/DL (ref 3.4–5)
ALP SERPL-CCNC: 107 U/L (ref 40–150)
ALT SERPL W P-5'-P-CCNC: 24 U/L (ref 0–50)
ANION GAP SERPL CALCULATED.3IONS-SCNC: 3 MMOL/L (ref 3–14)
AST SERPL W P-5'-P-CCNC: 16 U/L (ref 0–45)
BILIRUB SERPL-MCNC: 0.6 MG/DL (ref 0.2–1.3)
BUN SERPL-MCNC: 20 MG/DL (ref 7–30)
CALCIUM SERPL-MCNC: 9.1 MG/DL (ref 8.5–10.1)
CHLORIDE BLD-SCNC: 106 MMOL/L (ref 94–109)
CHOLEST SERPL-MCNC: 220 MG/DL
CO2 SERPL-SCNC: 30 MMOL/L (ref 20–32)
CREAT SERPL-MCNC: 0.96 MG/DL (ref 0.52–1.04)
CREAT UR-MCNC: 186 MG/DL
FASTING STATUS PATIENT QL REPORTED: YES
GFR SERPL CREATININE-BSD FRML MDRD: 59 ML/MIN/1.73M2
GLUCOSE BLD-MCNC: 97 MG/DL (ref 70–99)
HDLC SERPL-MCNC: 56 MG/DL
LDLC SERPL CALC-MCNC: 109 MG/DL
MICROALBUMIN UR-MCNC: 54 MG/L
MICROALBUMIN/CREAT UR: 29.03 MG/G CR (ref 0–25)
NONHDLC SERPL-MCNC: 164 MG/DL
POTASSIUM BLD-SCNC: 4.8 MMOL/L (ref 3.4–5.3)
PROT SERPL-MCNC: 8.1 G/DL (ref 6.8–8.8)
SODIUM SERPL-SCNC: 139 MMOL/L (ref 133–144)
TRIGL SERPL-MCNC: 274 MG/DL

## 2022-06-02 ENCOUNTER — HOSPITAL ENCOUNTER (OUTPATIENT)
Dept: MAMMOGRAPHY | Facility: CLINIC | Age: 82
Discharge: HOME | End: 2022-06-02
Attending: INTERNAL MEDICINE | Admitting: INTERNAL MEDICINE
Payer: COMMERCIAL

## 2022-06-02 DIAGNOSIS — Z12.31 VISIT FOR SCREENING MAMMOGRAM: ICD-10-CM

## 2022-06-02 PROCEDURE — 77067 SCR MAMMO BI INCL CAD: CPT

## 2022-10-10 ENCOUNTER — HEALTH MAINTENANCE LETTER (OUTPATIENT)
Age: 82
End: 2022-10-10

## 2022-10-12 DIAGNOSIS — G25.2 INTENTION TREMOR: ICD-10-CM

## 2022-10-14 RX ORDER — PROPRANOLOL HYDROCHLORIDE 80 MG/1
80 CAPSULE, EXTENDED RELEASE ORAL DAILY
Qty: 90 CAPSULE | Refills: 3
Start: 2022-10-14

## 2023-02-01 NOTE — TELEPHONE ENCOUNTER
Called the pt back.     She came in a few weeks ago with a few stool samples (was 1/3/2020).  She has had c-diff in the past.  They did some blood work.  She had a wellness test a few weeks ago.    She has a really nervous stomach.  She is going through something with her family.  She is trying some otc things.  She thinks she needs something stronger.  She says she has a nervous stomach and that is how it affects her - she gets diarrhea.      Advised she would need some kind of appt.  Phone visit scheduled.  Explained how the phone visit works.    
Reason for call:  Patient reporting a symptom    Symptom or request: diarrhea    Duration (how long have symptoms been present): a few weeks, on going, worse last night    Have you been treated for this before? Yes    Additional comments: none    Phone Number patient can be reached at:  Home number on file [phone] (home)    Best Time:  any    Can we leave a detailed message on this number:  YES    Call taken on 2/19/2020 at 9:15 AM by Shiresa H. Ormond    
Desi Campbell(Attending)

## 2023-04-17 ENCOUNTER — TELEPHONE (OUTPATIENT)
Dept: FAMILY MEDICINE | Facility: CLINIC | Age: 83
End: 2023-04-17

## 2023-04-17 NOTE — TELEPHONE ENCOUNTER
Reason for Call:  Appointment Request    Patient requesting this type of appt:  Preventive     Requested provider: Mera Gupta    Reason patient unable to be scheduled: Not within requested timeframe    When does patient want to be seen/preferred time: After 5/25/2023    Comments: Annual Physical    Could we send this information to you in [facility] or would you prefer to receive a phone call?:   Patient would prefer a phone call   Okay to leave a detailed message?: Yes at Home number on file [phone] (home)    Call taken on 4/17/2023 at 1:15 PM by JORDANA NUNEZ

## 2023-04-19 NOTE — TELEPHONE ENCOUNTER
Noted.   Cephalexin Counseling: I counseled the patient regarding use of cephalexin as an antibiotic for prophylactic and/or therapeutic purposes. Cephalexin (commonly prescribed under brand name Keflex) is a cephalosporin antibiotic which is active against numerous classes of bacteria, including most skin bacteria. Side effects may include nausea, diarrhea, gastrointestinal upset, rash, hives, yeast infections, and in rare cases, hepatitis, kidney disease, seizures, fever, confusion, neurologic symptoms, and others. Patients with severe allergies to penicillin medications are cautioned that there is about a 10% incidence of cross-reactivity with cephalosporins. When possible, patients with penicillin allergies should use alternatives to cephalosporins for antibiotic therapy.

## 2023-05-02 DIAGNOSIS — G25.2 INTENTION TREMOR: ICD-10-CM

## 2023-05-04 RX ORDER — PROPRANOLOL HYDROCHLORIDE 80 MG/1
CAPSULE, EXTENDED RELEASE ORAL
Qty: 90 CAPSULE | Refills: 0 | Status: SHIPPED | OUTPATIENT
Start: 2023-05-04 | End: 2023-06-21 | Stop reason: ALTCHOICE

## 2023-05-04 NOTE — TELEPHONE ENCOUNTER
Prescription approved per [facility] Refill Protocol.  Appt scheduled 6/21/23    Rosa Shen RN, BSN  [facility]

## 2023-05-23 ENCOUNTER — PATIENT OUTREACH (OUTPATIENT)
Dept: CARE COORDINATION | Facility: CLINIC | Age: 83
End: 2023-05-23
Payer: COMMERCIAL

## 2023-06-06 ENCOUNTER — HOSPITAL ENCOUNTER (OUTPATIENT)
Dept: MAMMOGRAPHY | Facility: CLINIC | Age: 83
Discharge: HOME | End: 2023-06-06
Attending: INTERNAL MEDICINE | Admitting: INTERNAL MEDICINE
Payer: COMMERCIAL

## 2023-06-06 DIAGNOSIS — Z12.31 VISIT FOR SCREENING MAMMOGRAM: ICD-10-CM

## 2023-06-06 PROCEDURE — 77067 SCR MAMMO BI INCL CAD: CPT

## 2023-06-21 ENCOUNTER — OFFICE VISIT (OUTPATIENT)
Dept: FAMILY MEDICINE | Facility: CLINIC | Age: 83
End: 2023-06-21
Payer: COMMERCIAL

## 2023-06-21 VITALS
WEIGHT: 172 LBS | HEIGHT: 65 IN | DIASTOLIC BLOOD PRESSURE: 72 MMHG | RESPIRATION RATE: 24 BRPM | TEMPERATURE: 97.3 F | OXYGEN SATURATION: 95 % | BODY MASS INDEX: 28.66 KG/M2 | SYSTOLIC BLOOD PRESSURE: 138 MMHG | HEART RATE: 69 BPM

## 2023-06-21 DIAGNOSIS — B07.8 COMMON WART: ICD-10-CM

## 2023-06-21 DIAGNOSIS — Z00.00 ENCOUNTER FOR MEDICARE ANNUAL WELLNESS EXAM: Primary | ICD-10-CM

## 2023-06-21 DIAGNOSIS — G25.2 INTENTION TREMOR: ICD-10-CM

## 2023-06-21 DIAGNOSIS — E78.5 HYPERLIPIDEMIA LDL GOAL <160: ICD-10-CM

## 2023-06-21 DIAGNOSIS — K58.0 IRRITABLE BOWEL SYNDROME WITH DIARRHEA: ICD-10-CM

## 2023-06-21 DIAGNOSIS — F41.9 ANXIETY: ICD-10-CM

## 2023-06-21 DIAGNOSIS — N18.31 STAGE 3A CHRONIC KIDNEY DISEASE (H): ICD-10-CM

## 2023-06-21 LAB
ALBUMIN SERPL BCG-MCNC: 4.4 G/DL (ref 3.5–5.2)
ALP SERPL-CCNC: 113 U/L (ref 35–104)
ALT SERPL W P-5'-P-CCNC: 14 U/L (ref 0–50)
ANION GAP SERPL CALCULATED.3IONS-SCNC: 12 MMOL/L (ref 7–15)
AST SERPL W P-5'-P-CCNC: 24 U/L (ref 0–45)
BILIRUB SERPL-MCNC: 0.7 MG/DL
BUN SERPL-MCNC: 23.1 MG/DL (ref 8–23)
CALCIUM SERPL-MCNC: 9.8 MG/DL (ref 8.8–10.2)
CHLORIDE SERPL-SCNC: 104 MMOL/L (ref 98–107)
CHOLEST SERPL-MCNC: 204 MG/DL
CREAT SERPL-MCNC: 1.14 MG/DL (ref 0.51–0.95)
CREAT UR-MCNC: 248 MG/DL
DEPRECATED HCO3 PLAS-SCNC: 25 MMOL/L (ref 22–29)
GFR SERPL CREATININE-BSD FRML MDRD: 48 ML/MIN/1.73M2
GLUCOSE SERPL-MCNC: 103 MG/DL (ref 70–99)
HDLC SERPL-MCNC: 58 MG/DL
HGB BLD-MCNC: 14.8 G/DL (ref 11.7–15.7)
LDLC SERPL CALC-MCNC: 109 MG/DL
MICROALBUMIN UR-MCNC: 180 MG/L
MICROALBUMIN/CREAT UR: 72.58 MG/G CR (ref 0–25)
NONHDLC SERPL-MCNC: 146 MG/DL
POTASSIUM SERPL-SCNC: 4.8 MMOL/L (ref 3.4–5.3)
PROT SERPL-MCNC: 7.7 G/DL (ref 6.4–8.3)
SODIUM SERPL-SCNC: 141 MMOL/L (ref 136–145)
TRIGL SERPL-MCNC: 187 MG/DL

## 2023-06-21 PROCEDURE — 82570 ASSAY OF URINE CREATININE: CPT | Performed by: INTERNAL MEDICINE

## 2023-06-21 PROCEDURE — 80061 LIPID PANEL: CPT | Performed by: INTERNAL MEDICINE

## 2023-06-21 PROCEDURE — 36415 COLL VENOUS BLD VENIPUNCTURE: CPT | Performed by: INTERNAL MEDICINE

## 2023-06-21 PROCEDURE — 99214 OFFICE O/P EST MOD 30 MIN: CPT | Mod: 25 | Performed by: INTERNAL MEDICINE

## 2023-06-21 PROCEDURE — 82043 UR ALBUMIN QUANTITATIVE: CPT | Performed by: INTERNAL MEDICINE

## 2023-06-21 PROCEDURE — G0439 PPPS, SUBSEQ VISIT: HCPCS | Performed by: INTERNAL MEDICINE

## 2023-06-21 PROCEDURE — 85018 HEMOGLOBIN: CPT | Performed by: INTERNAL MEDICINE

## 2023-06-21 PROCEDURE — 80053 COMPREHEN METABOLIC PANEL: CPT | Performed by: INTERNAL MEDICINE

## 2023-06-21 RX ORDER — PROPRANOLOL HYDROCHLORIDE 120 MG/1
120 CAPSULE, EXTENDED RELEASE ORAL DAILY
Qty: 90 CAPSULE | Refills: 1 | Status: SHIPPED | OUTPATIENT
Start: 2023-06-21 | End: 2024-01-03

## 2023-06-21 RX ORDER — AMITRIPTYLINE HYDROCHLORIDE 10 MG/1
TABLET ORAL
Qty: 90 TABLET | Refills: 3 | Status: SHIPPED | OUTPATIENT
Start: 2023-06-21 | End: 2024-07-16

## 2023-06-21 RX ORDER — PROPRANOLOL HYDROCHLORIDE 80 MG/1
80 CAPSULE, EXTENDED RELEASE ORAL DAILY
Qty: 90 CAPSULE | Refills: 3 | Status: CANCELLED | OUTPATIENT
Start: 2023-06-21

## 2023-06-21 ASSESSMENT — ENCOUNTER SYMPTOMS
CONSTIPATION: 0
ABDOMINAL PAIN: 0
BREAST MASS: 0
DIZZINESS: 0
DIARRHEA: 1
WEAKNESS: 0
NAUSEA: 0
HEMATURIA: 0
ARTHRALGIAS: 0
NERVOUS/ANXIOUS: 1
PARESTHESIAS: 0
SORE THROAT: 0
HEADACHES: 0
JOINT SWELLING: 0
HEARTBURN: 0
DYSURIA: 0
MYALGIAS: 0
PALPITATIONS: 0
EYE PAIN: 0
HEMATOCHEZIA: 0
CHILLS: 0
COUGH: 0
FEVER: 0
SHORTNESS OF BREATH: 0
FREQUENCY: 0

## 2023-06-21 ASSESSMENT — PAIN SCALES - GENERAL: PAINLEVEL: NO PAIN (0)

## 2023-06-21 ASSESSMENT — ACTIVITIES OF DAILY LIVING (ADL): CURRENT_FUNCTION: NO ASSISTANCE NEEDED

## 2023-06-21 NOTE — PATIENT INSTRUCTIONS
Patient Education   Personalized Prevention Plan  You are due for the preventive services outlined below.  Your care team is available to assist you in scheduling these services.  If you have already completed any of these items, please share that information with your care team to update in your medical record.  Health Maintenance Due   Topic Date Due     COVID-19 Vaccine (5 - Pfizer series) 02/12/2023     Diptheria Tetanus Pertussis (DTAP/TDAP/TD) Vaccine (2 - Td or Tdap) 03/06/2023     Colorectal Cancer Screening  04/19/2023     Basic Metabolic Panel  05/25/2023     Cholesterol Lab  05/25/2023     Kidney Microalbumin Urine Test  05/25/2023     ANNUAL REVIEW OF HM ORDERS  05/25/2023     Hemoglobin  05/25/2023       Preventing Falls at Home  A person can fall for many reasons. Older adults may fall because reaction time slows down as we age. Your muscles and joints may get stiff, weak, or less flexible because of illness, medicines, or a physical condition.   Other health problems that make falls more likely include:     Arthritis    Dizziness or lightheadedness when you stand up (orthostatic hypotension)    History of a stroke    Dizziness    Anemia    Certain medicines taken for mental illness or to control blood pressure.    Problems with balance or gait    Bladder or urinary problems    History of falling    Changes in vision (vision impairment)    Changes in thinking skills and memory (cognitive impairment)  Injuries from a fall can include serious injuries such as broken bones, dislocated joints, internal bleeding and cuts. Injuries like these can limit your independence.   Prevention tips  To help prevent falls and fall-related injuries, follow the tips below.    Floors  To make floors safer:     Put nonskid pads under area rugs.    Remove small rugs.    Replace worn floor coverings.    Tack carpets firmly to each step on carpeted stairs. Put nonskid strips on the edges of uncarpeted stairs.    Keep floors  and stairs free of clutter and cords.    Arrange furniture so there are clear pathways.    Clean up any spills right away.  Bathrooms    To make bathrooms safer:     Install grab bars in the tub or shower.    Apply nonskid strips or put a nonskid rubber mat in the tub or shower.    Sit on a bath chair to bathe.    Use bathmats with nonskid backing.  Lighting  To improve visibility in your home:      Keep a flashlight in each room. Or put a lamp next to the bed within easy reach.    Put nightlights in the bedrooms, hallways, kitchen, and bathrooms.    Make sure all stairways have good lighting.    Take your time when going up and down stairs.    Put handrails on both sides of stairs and in walkways for more support. To prevent injury to your wrist or arm, don t use handrails to pull yourself up.    Install grab bars to pull yourself up.    Move or rearrange items that you use often. This will make them easier to find or reach.    Look at your home to find any safety hazards. Especially look at doorways, walkways, and the driveway. Remove or repair any safety problems that you find.  Other changes to make    Look around to find any safety hazards. Look closely at doorways, walkways, and the driveway. Remove or repair any safety problems that you find.    Wear shoes that fit well.    Take your time when going up and down stairs.    Put handrails on both sides of stairs and in walkways for more support. To prevent injury to your wrist or arm, don t use handrails to pull yourself up.    Install grab bars wherever needed to pull yourself up.    Arrange items that you use often. This will make them easier to find or reach.    [employer] last reviewed this educational content on 3/1/2020    4793-3879 [employer]. All rights reserved. This information is not intended as a substitute for professional medical care. Always follow your healthcare professional's instructions.

## 2023-06-21 NOTE — PROGRESS NOTES
"Chief Complaint   Patient presents with     Medicare Visit     LABS: patient is fasting         SUBJECTIVE:   Sommer is a 82 year old who presents for Preventive Visit.      6/21/2023    10:01 AM   Additional Questions   Roomed by Kendra NOONAN   Accompanied by no one         6/21/2023    10:01 AM   Patient Reported Additional Medications   Patient reports taking the following new medications Metamucil     Are you in the first 12 months of your Medicare coverage?  No    Healthy Habits:     In general, how would you rate your overall health?  Good    Frequency of exercise:  2-3 days/week    Duration of exercise:  15-30 minutes    Do you usually eat at least 4 servings of fruit and vegetables a day, include whole grains    & fiber and avoid regularly eating high fat or \"junk\" foods?  No    Taking medications regularly:  Yes    Medication side effects:  None and Other    Ability to successfully perform activities of daily living:  No assistance needed    Home Safety:  No safety concerns identified    Hearing Impairment:  Feel that people are mumbling or not speaking clearly    In the past 6 months, have you been bothered by leaking of urine?  No    In general, how would you rate your overall mental or emotional health?  Good      PHQ-2 Total Score: 0    Additional concerns today:  No        Have you ever done Advance Care Planning? (For example, a Health Directive, POLST, or a discussion with a medical provider or your loved ones about your wishes): Yes, advance care planning is on file.       Fall risk  Fallen 2 or more times in the past year?: No  Any fall with injury in the past year?: Yes    Cognitive Screening   1) Repeat 3 items (Leader, Season, Table)    2) Clock draw: NORMAL  3) 3 item recall: Recalls 3 objects  Results: 3 items recalled: COGNITIVE IMPAIRMENT LESS LIKELY    Mini-CogTM Copyright SARAN Sharif. Licensed by the author for use in [facility]; reprinted with permission (mila@.[facility]). All rights " reserved.      Do you have sleep apnea, excessive snoring or daytime drowsiness?: no    Reviewed and updated as needed this visit by clinical staff   Tobacco  Allergies  Meds  Problems  Med Hx  Surg Hx  Fam Hx          Reviewed and updated as needed this visit by Provider   Tobacco  Allergies  Meds  Problems  Med Hx  Surg Hx  Fam Hx         Social History     Tobacco Use     Smoking status: Never     Smokeless tobacco: Never   Substance Use Topics     Alcohol use: Yes     Alcohol/week: 0.0 standard drinks of alcohol     Comment: very seldom             6/21/2023     9:48 AM   Alcohol Use   Prescreen: >3 drinks/day or >7 drinks/week? No     Do you have a current opioid prescription? No  Do you use any other controlled substances or medications that are not prescribed by a provider? None      Current providers sharing in care for this patient include:   Patient Care Team:  Mera Gupta MD as PCP - General  Mera Gupta MD as Assigned PCP    The following health maintenance items are reviewed in Epic and correct as of today:  Health Maintenance   Topic Date Due     COVID-19 Vaccine (5 - Pfizer series) 02/12/2023     DTAP/TDAP/TD IMMUNIZATION (2 - Td or Tdap) 03/06/2023     COLORECTAL CANCER SCREENING  04/19/2023     BMP  05/25/2023     LIPID  05/25/2023     MICROALBUMIN  05/25/2023     ANNUAL REVIEW OF HM ORDERS  05/25/2023     HEMOGLOBIN  05/25/2023     INFLUENZA VACCINE (Season Ended) 09/01/2023     MAMMO SCREENING  06/06/2024     MEDICARE ANNUAL WELLNESS VISIT  06/21/2024     FALL RISK ASSESSMENT  06/21/2024     ADVANCE CARE PLANNING  06/05/2027     DEXA  04/12/2028     PHQ-2 (once per calendar year)  Completed     Pneumococcal Vaccine: 65+ Years  Completed     URINALYSIS  Completed     ZOSTER IMMUNIZATION  Completed     IPV IMMUNIZATION  Aged Out     MENINGITIS IMMUNIZATION  Aged Out     Labs reviewed in EPIC  BP Readings from Last 3 Encounters:   06/21/23 138/72   05/25/22 134/68    02/11/21 118/64    Wt Readings from Last 3 Encounters:   06/21/23 78 kg (172 lb)   05/25/22 79.2 kg (174 lb 9.6 oz)   02/11/21 80.9 kg (178 lb 4.8 oz)                  Patient Active Problem List   Diagnosis     Symptomatic menopausal or female climacteric states     Urethral fistula     Hematuria     CARDIOVASCULAR SCREENING; LDL GOAL LESS THAN 160     HYPERLIPIDEMIA LDL GOAL <160     Advanced directives, counseling/discussion     Anxiety     Presence of pessary     Palpitations     CKD (chronic kidney disease) stage 3, GFR 30-59 ml/min (H)     Past Surgical History:   Procedure Laterality Date     COLONOSCOPY  4/19/2013    colonoscopy     COLONOSCOPY  4/19/2013    Procedure: COLONOSCOPY;  Colonoscopy ;  Surgeon: Harman Becerra MD;  Location:  GI     HC FLEX SIGMOIDOSCOPY W/WO QUYNH SPEC BY BRUSH/WASH  1993    Flex sig. and  colonoscopy 2002     [facility] REMOVAL GALLBLADDER  1973    Cholecystectomy.     TONSILLECTOMY & ADENOIDECTOMY      T&A age 12     [facility] APPENDECTOMY  1973    Appendectomy.     [facility] NONSPECIFIC PROCEDURE      Sebaceous cyst.     [facility] NONSPECIFIC PROCEDURE      Ganglion cyst.     [facility] NONSPECIFIC PROCEDURE      Oral surg./Plastic surg. - chin.     [facility] COLONOSCOPY THRU STOMA, DIAGNOSTIC  4/02    Dr. Tavarez, normal no polyps       Social History     Tobacco Use     Smoking status: Never     Smokeless tobacco: Never   Substance Use Topics     Alcohol use: Yes     Alcohol/week: 0.0 standard drinks of alcohol     Comment: very seldom     Family History   Problem Relation Age of Onset     Diabetes Mother      Heart Disease Mother      Cancer - colorectal Father         Colon Cancer late 70's     Circulatory Sister         CHF     Heart Disease Sister         defib placed     Neurologic Disorder Son         Multiple sclerosis     Cerebrovascular Disease Sister      Cancer - colorectal Other         niece in her 50's          Current Outpatient Medications   Medication Sig Dispense Refill     ACE/ARB/ARNI  NOT PRESCRIBED (INTENTIONAL) Please choose reason not prescribed from choices below.       amitriptyline (ELAVIL) 10 MG tablet TAKE ONE TABLET BY MOUTH AT BEDTIME 90 tablet 3     calcium carbonate-vitamin D (CALCIUM + D) 600-200 MG-UNIT TABS Take  by mouth.       Multiple Vitamins-Minerals (CENTRUM SILVER) per tablet Take 1 tablet by mouth daily.       propranolol ER (INDERAL LA) 120 MG 24 hr capsule Take 1 capsule (120 mg) by mouth daily 90 capsule 1     Allergies   Allergen Reactions     Erythromycin Anaphylaxis     severe reaction, rash and throat swelling.      Recent Labs   Lab Test 06/21/23  1056 05/25/22  1058 02/11/21  1009 01/29/20  1014 01/03/20  0948   A1C  --  5.4  --   --   --    * 109* 114*   < >  --    HDL 58 56 66   < >  --    TRIG 187* 274* 180*   < >  --    ALT 14 24 32  --  30   CR 1.14* 0.96 1.05*  --  0.96   GFRESTIMATED 48* 59* 50*  --  56*   GFRESTBLACK  --   --  58*  --  65   POTASSIUM 4.8 4.8 4.8  --  4.2    < > = values in this interval not displayed.        Pertinent mammograms are reviewed under the imaging tab.    Review of Systems   Constitutional: Negative for chills and fever.   HENT: Negative for congestion, ear pain, hearing loss and sore throat.    Eyes: Negative for pain and visual disturbance.   Respiratory: Negative for cough and shortness of breath.    Cardiovascular: Negative for chest pain, palpitations and peripheral edema.   Gastrointestinal: Positive for diarrhea. Negative for abdominal pain, constipation, heartburn, hematochezia and nausea.   Breasts:  Negative for tenderness, breast mass and discharge.   Genitourinary: Positive for vaginal discharge. Negative for dysuria, frequency, genital sores, hematuria, pelvic pain, urgency and vaginal bleeding.   Musculoskeletal: Negative for arthralgias, joint swelling and myalgias.   Skin: Negative for rash.   Neurological: Negative for dizziness, weakness, headaches and paresthesias.   Psychiatric/Behavioral: Negative for  "mood changes. The patient is nervous/anxious.          OBJECTIVE:   BP (!) 160/82 (BP Location: Right arm, Patient Position: Sitting, Cuff Size: Adult Regular)   Pulse 69   Temp 97.3  F (36.3  C) (Oral)   Resp 24   Ht 1.638 m (5' 4.5\")   Wt 78 kg (172 lb)   LMP  (LMP Unknown)   SpO2 95%   BMI 29.07 kg/m   Estimated body mass index is 29.07 kg/m  as calculated from the following:    Height as of this encounter: 1.638 m (5' 4.5\").    Weight as of this encounter: 78 kg (172 lb).  Physical Exam  GENERAL: healthy, alert and no distress  EYES: Eyes grossly normal to inspection  NECK: no adenopathy, no asymmetry, masses, or scars and thyroid normal to palpation  RESP: lungs clear to auscultation - no rales, rhonchi or wheezes  BREAST: normal without masses, tenderness or nipple discharge and no palpable axillary masses or adenopathy  CV: regular rate and rhythm, normal S1 S2, no S3 or S4, no murmur, click or rub, no peripheral edema and peripheral pulses strong  ABDOMEN: soft, nontender, no hepatosplenomegaly, no masses and bowel sounds normal  MS: no gross musculoskeletal defects noted, no edema  SKIN: left knee with verrucous lesion; hypertrophied, no erythema. No ulceration.   NEURO: mild intention tremor. Normal strength and tone, mentation intact and speech normal  PSYCH: mentation appears normal, affect normal/bright    Diagnostic Test Results:  Labs reviewed in Epic              ASSESSMENT / PLAN:   (Z00.00) Encounter for Medicare annual wellness exam  (primary encounter diagnosis)  Comment: HEALTH CARE MAINTENANCE reviewed  Plan: goals of therapy reviewed    (N18.31) Stage 3a chronic kidney disease (H)  Comment: labs rodriguez; monitor renal function; meds, etc  Plan: Albumin Random Urine Quantitative with Creat         Ratio, Hemoglobin, Comprehensive metabolic         panel (BMP + Alb, Alk Phos, ALT, AST, Total.         Bili, TP), ACE/ARB/ARNI NOT PRESCRIBED         (INTENTIONAL)          (E78.5) Hyperlipidemia " LDL goal <160  Comment: controlling with diet and exercise.   Plan: Lipid panel reflex to direct LDL Non-fasting,         Comprehensive metabolic panel (BMP + Alb, Alk         Phos, ALT, AST, Total. Bili, TP)          (G25.2) Intention tremor  Comment: L >R; intention tremor;  Propranolol started Fall 2017; writing OK, no Parkinson's; gait OK; she is interested in increased dose. watch HR; minimal on exam today; she acknowledges good to day; but can worsen periodically; options reviewed; she prefers to remains on current medication at this time. Ongoing assessment.  Plan: propranolol ER (INDERAL LA) 120 MG 24 hr capsule          (K58.0) Irritable bowel syndrome with diarrhea  Comment: med felt to be helpful.   Plan: amitriptyline (ELAVIL) 10 MG tablet          (F41.9) Anxiety  Comment: triggers reveiwed; BB can be helpful   Plan: no other meds at this time.     (B07.8) Common wart  Comment: left knee; quite thick lesion- discussed OTC treatment options.  Plan:     Patient has been advised of split billing requirements and indicates understanding: Yes      COUNSELING:  Reviewed preventive health counseling, as reflected in patient instructions       Regular exercise       Healthy diet/nutrition        She reports that she has never smoked. She has never used smokeless tobacco.      Appropriate preventive services were discussed with this patient, including applicable screening as appropriate for cardiovascular disease, diabetes, osteopenia/osteoporosis, and glaucoma.  As appropriate for age/gender, discussed screening for colorectal cancer, prostate cancer, breast cancer, and cervical cancer. Checklist reviewing preventive services available has been given to the patient.    Reviewed patients plan of care and provided an AVS. The Complex Care Plan (for patients with higher acuity and needing more deliberate coordination of services) for Sommer meets the Care Plan requirement. This Care Plan has been established and  reviewed with the Patient.      Mera Gupta MD  Internal Medicine   [facility]    Identified Health Risks:    I have reviewed Opioid Use Disorder and Substance Use Disorder risk factors and made any needed referrals.       She is at risk for falling and has been provided with information to reduce the risk of falling at home.

## 2024-01-02 DIAGNOSIS — G25.2 INTENTION TREMOR: ICD-10-CM

## 2024-01-03 RX ORDER — PROPRANOLOL HYDROCHLORIDE 120 MG/1
120 CAPSULE, EXTENDED RELEASE ORAL DAILY
Qty: 90 CAPSULE | Refills: 1 | Status: SHIPPED | OUTPATIENT
Start: 2024-01-03 | End: 2024-07-16

## 2024-05-07 ENCOUNTER — PATIENT OUTREACH (OUTPATIENT)
Dept: CARE COORDINATION | Facility: CLINIC | Age: 84
End: 2024-05-07
Payer: COMMERCIAL

## 2024-06-04 ENCOUNTER — PATIENT OUTREACH (OUTPATIENT)
Dept: CARE COORDINATION | Facility: CLINIC | Age: 84
End: 2024-06-04
Payer: COMMERCIAL

## 2024-07-05 ENCOUNTER — HOSPITAL ENCOUNTER (OUTPATIENT)
Dept: MAMMOGRAPHY | Facility: CLINIC | Age: 84
Discharge: HOME | End: 2024-07-05
Attending: INTERNAL MEDICINE | Admitting: INTERNAL MEDICINE
Payer: COMMERCIAL

## 2024-07-05 DIAGNOSIS — Z12.31 VISIT FOR SCREENING MAMMOGRAM: ICD-10-CM

## 2024-07-05 PROCEDURE — 77063 BREAST TOMOSYNTHESIS BI: CPT

## 2024-07-16 DIAGNOSIS — G25.2 INTENTION TREMOR: ICD-10-CM

## 2024-07-16 DIAGNOSIS — K58.0 IRRITABLE BOWEL SYNDROME WITH DIARRHEA: ICD-10-CM

## 2024-07-16 RX ORDER — AMITRIPTYLINE HYDROCHLORIDE 10 MG/1
TABLET ORAL
Qty: 90 TABLET | Refills: 3 | Status: SHIPPED | OUTPATIENT
Start: 2024-07-16 | End: 2024-07-24

## 2024-07-16 RX ORDER — PROPRANOLOL HYDROCHLORIDE 120 MG/1
120 CAPSULE, EXTENDED RELEASE ORAL DAILY
Qty: 90 CAPSULE | Refills: 1 | Status: SHIPPED | OUTPATIENT
Start: 2024-07-16 | End: 2024-07-24

## 2024-07-24 ENCOUNTER — OFFICE VISIT (OUTPATIENT)
Dept: FAMILY MEDICINE | Facility: CLINIC | Age: 84
End: 2024-07-24
Payer: COMMERCIAL

## 2024-07-24 VITALS
OXYGEN SATURATION: 97 % | BODY MASS INDEX: 29.04 KG/M2 | HEIGHT: 64 IN | DIASTOLIC BLOOD PRESSURE: 80 MMHG | WEIGHT: 170.1 LBS | HEART RATE: 58 BPM | RESPIRATION RATE: 20 BRPM | TEMPERATURE: 97.9 F | SYSTOLIC BLOOD PRESSURE: 156 MMHG

## 2024-07-24 DIAGNOSIS — R80.9 MICROALBUMINURIA: ICD-10-CM

## 2024-07-24 DIAGNOSIS — Z00.00 ENCOUNTER FOR MEDICARE ANNUAL WELLNESS EXAM: Primary | ICD-10-CM

## 2024-07-24 DIAGNOSIS — K58.0 IRRITABLE BOWEL SYNDROME WITH DIARRHEA: ICD-10-CM

## 2024-07-24 DIAGNOSIS — Z23 NEED FOR TDAP VACCINATION: ICD-10-CM

## 2024-07-24 DIAGNOSIS — N18.31 STAGE 3A CHRONIC KIDNEY DISEASE (H): ICD-10-CM

## 2024-07-24 DIAGNOSIS — G25.2 INTENTION TREMOR: ICD-10-CM

## 2024-07-24 DIAGNOSIS — E78.5 HYPERLIPIDEMIA LDL GOAL <160: ICD-10-CM

## 2024-07-24 LAB
ALBUMIN SERPL BCG-MCNC: 4.5 G/DL (ref 3.5–5.2)
ALP SERPL-CCNC: 110 U/L (ref 40–150)
ALT SERPL W P-5'-P-CCNC: 21 U/L (ref 0–50)
ANION GAP SERPL CALCULATED.3IONS-SCNC: 12 MMOL/L (ref 7–15)
AST SERPL W P-5'-P-CCNC: 33 U/L (ref 0–45)
BILIRUB SERPL-MCNC: 0.5 MG/DL
BUN SERPL-MCNC: 23 MG/DL (ref 8–23)
CALCIUM SERPL-MCNC: 9.8 MG/DL (ref 8.8–10.4)
CHLORIDE SERPL-SCNC: 101 MMOL/L (ref 98–107)
CHOLEST SERPL-MCNC: 217 MG/DL
CREAT SERPL-MCNC: 1.14 MG/DL (ref 0.51–0.95)
CREAT UR-MCNC: 147 MG/DL
EGFRCR SERPLBLD CKD-EPI 2021: 47 ML/MIN/1.73M2
FASTING STATUS PATIENT QL REPORTED: YES
FASTING STATUS PATIENT QL REPORTED: YES
GLUCOSE SERPL-MCNC: 105 MG/DL (ref 70–99)
HCO3 SERPL-SCNC: 27 MMOL/L (ref 22–29)
HDLC SERPL-MCNC: 57 MG/DL
LDLC SERPL CALC-MCNC: 106 MG/DL
MICROALBUMIN UR-MCNC: 68.3 MG/L
MICROALBUMIN/CREAT UR: 46.46 MG/G CR (ref 0–25)
NONHDLC SERPL-MCNC: 160 MG/DL
POTASSIUM SERPL-SCNC: 4.7 MMOL/L (ref 3.4–5.3)
PROT SERPL-MCNC: 8 G/DL (ref 6.4–8.3)
SODIUM SERPL-SCNC: 140 MMOL/L (ref 135–145)
TRIGL SERPL-MCNC: 271 MG/DL

## 2024-07-24 PROCEDURE — 82043 UR ALBUMIN QUANTITATIVE: CPT | Performed by: GENERAL PRACTICE

## 2024-07-24 PROCEDURE — 80061 LIPID PANEL: CPT | Performed by: GENERAL PRACTICE

## 2024-07-24 PROCEDURE — 82570 ASSAY OF URINE CREATININE: CPT | Performed by: GENERAL PRACTICE

## 2024-07-24 PROCEDURE — 80053 COMPREHEN METABOLIC PANEL: CPT | Performed by: GENERAL PRACTICE

## 2024-07-24 PROCEDURE — G0439 PPPS, SUBSEQ VISIT: HCPCS | Performed by: GENERAL PRACTICE

## 2024-07-24 PROCEDURE — 36415 COLL VENOUS BLD VENIPUNCTURE: CPT | Performed by: GENERAL PRACTICE

## 2024-07-24 RX ORDER — AMITRIPTYLINE HYDROCHLORIDE 10 MG/1
TABLET ORAL
Qty: 90 TABLET | Refills: 3 | Status: SHIPPED | OUTPATIENT
Start: 2024-07-24

## 2024-07-24 RX ORDER — PROPRANOLOL HYDROCHLORIDE 120 MG/1
120 CAPSULE, EXTENDED RELEASE ORAL DAILY
Qty: 90 CAPSULE | Refills: 3 | Status: SHIPPED | OUTPATIENT
Start: 2024-07-24

## 2024-07-24 SDOH — HEALTH STABILITY: PHYSICAL HEALTH: ON AVERAGE, HOW MANY DAYS PER WEEK DO YOU ENGAGE IN MODERATE TO STRENUOUS EXERCISE (LIKE A BRISK WALK)?: 1 DAY

## 2024-07-24 ASSESSMENT — PAIN SCALES - GENERAL: PAINLEVEL: NO PAIN (0)

## 2024-07-24 ASSESSMENT — SOCIAL DETERMINANTS OF HEALTH (SDOH): HOW OFTEN DO YOU GET TOGETHER WITH FRIENDS OR RELATIVES?: ONCE A WEEK

## 2024-07-24 NOTE — NURSING NOTE
"Chief Complaint   Patient presents with    Medicare Visit    Blood Draw     Fasting labs     Initial BP (!) 163/87 (BP Location: Right arm, Patient Position: Sitting, Cuff Size: Adult Regular)   Pulse 58   Temp 97.9  F (36.6  C) (Oral)   Resp 20   Ht 1.632 m (5' 4.25\")   Wt 77.2 kg (170 lb 1.6 oz)   LMP  (LMP Unknown)   SpO2 97%   BMI 28.97 kg/m   Estimated body mass index is 28.97 kg/m  as calculated from the following:    Height as of this encounter: 1.632 m (5' 4.25\").    Weight as of this encounter: 77.2 kg (170 lb 1.6 oz).  BP completed using cuff size regular right arm    Lisa Magill, CMA    "

## 2024-07-24 NOTE — PATIENT INSTRUCTIONS
Patient Education   Preventive Care Advice   This is general advice given by our system to help you stay healthy. However, your care team may have specific advice just for you. Please talk to your care team about your preventive care needs.  Nutrition  Eat 5 or more servings of fruits and vegetables each day.  Try wheat bread, brown rice and whole grain pasta (instead of white bread, rice, and pasta).  Get enough calcium and vitamin D. Check the label on foods and aim for 100% of the RDA (recommended daily allowance).  Lifestyle  Exercise at least 150 minutes each week  (30 minutes a day, 5 days a week).  Do muscle strengthening activities 2 days a week. These help control your weight and prevent disease.  No smoking.  Wear sunscreen to prevent skin cancer.  Have a dental exam and cleaning every 6 months.  Yearly exams  See your health care team every year to talk about:  Any changes in your health.  Any medicines your care team has prescribed.  Preventive care, family planning, and ways to prevent chronic diseases.  Shots (vaccines)   HPV shots (up to age 26), if you've never had them before.  Hepatitis B shots (up to age 59), if you've never had them before.  COVID-19 shot: Get this shot when it's due.  Flu shot: Get a flu shot every year.  Tetanus shot: Get a tetanus shot every 10 years.  Pneumococcal, hepatitis A, and RSV shots: Ask your care team if you need these based on your risk.  Shingles shot (for age 50 and up)  General health tests  Diabetes screening:  Starting at age 35, Get screened for diabetes at least every 3 years.  If you are younger than age 35, ask your care team if you should be screened for diabetes.  Cholesterol test: At age 39, start having a cholesterol test every 5 years, or more often if advised.  Bone density scan (DEXA): At age 50, ask your care team if you should have this scan for osteoporosis (brittle bones).  Hepatitis C: Get tested at least once in your life.  STIs (sexually  transmitted infections)  Before age 24: Ask your care team if you should be screened for STIs.  After age 24: Get screened for STIs if you're at risk. You are at risk for STIs (including HIV) if:  You are sexually active with more than one person.  You don't use condoms every time.  You or a partner was diagnosed with a sexually transmitted infection.  If you are at risk for HIV, ask about PrEP medicine to prevent HIV.  Get tested for HIV at least once in your life, whether you are at risk for HIV or not.  Cancer screening tests  Cervical cancer screening: If you have a cervix, begin getting regular cervical cancer screening tests starting at age 21.  Breast cancer scan (mammogram): If you've ever had breasts, begin having regular mammograms starting at age 40. This is a scan to check for breast cancer.  Colon cancer screening: It is important to start screening for colon cancer at age 45.  Have a colonoscopy test every 10 years (or more often if you're at risk) Or, ask your provider about stool tests like a FIT test every year or Cologuard test every 3 years.  To learn more about your testing options, visit:   .  For help making a decision, visit:   https://bit.ly/bt74431.  Prostate cancer screening test: If you have a prostate, ask your care team if a prostate cancer screening test (PSA) at age 55 is right for you.  Lung cancer screening: If you are a current or former smoker ages 50 to 80, ask your care team if ongoing lung cancer screenings are right for you.  For informational purposes only. Not to replace the advice of your health care provider. Copyright   2023 [facility] [employer]. All rights reserved. Clinically reviewed by the [facility] Transitions Program. [employer] 462236 - REV 01/24.  Hearing Loss: Care Instructions  Overview     Hearing loss is a sudden or slow decrease in how well you hear. It can range from slight to profound. Permanent hearing loss can occur with aging. It also can  happen when you are exposed long-term to loud noise. Examples include listening to loud music, riding motorcycles, or being around other loud machines.  Hearing loss can affect your work and home life. It can make you feel lonely or depressed. You may feel that you have lost your independence. But hearing aids and other devices can help you hear better and feel connected to others.  Follow-up care is a key part of your treatment and safety. Be sure to make and go to all appointments, and call your doctor if you are having problems. It's also a good idea to know your test results and keep a list of the medicines you take.  How can you care for yourself at home?  Avoid loud noises whenever possible. This helps keep your hearing from getting worse.  Always wear hearing protection around loud noises.  Wear a hearing aid as directed.  A professional can help you pick a hearing aid that will work best for you.  You can also get hearing aids over the counter for mild to moderate hearing loss.  Have hearing tests as your doctor suggests. They can show whether your hearing has changed. Your hearing aid may need to be adjusted.  Use other devices as needed. These may include:  Telephone amplifiers and hearing aids that can connect to a television, stereo, radio, or microphone.  Devices that use lights or vibrations. These alert you to the doorbell, a ringing telephone, or a baby monitor.  Television closed-captioning. This shows the words at the bottom of the screen. Most new TVs can do this.  TTY (text telephone). This lets you type messages back and forth on the telephone instead of talking or listening. These devices are also called TDD. When messages are typed on the keyboard, they are sent over the phone line to a receiving TTY. The message is shown on a monitor.  Use text messaging, social media, and email if it is hard for you to communicate by telephone.  Try to learn a listening technique called speechreading. It is  "not lipreading. You pay attention to people's gestures, expressions, posture, and tone of voice. These clues can help you understand what a person is saying. Face the person you are talking to, and have them face you. Make sure the lighting is good. You need to see the other person's face clearly.  Think about counseling if you need help to adjust to your hearing loss.  When should you call for help?  Watch closely for changes in your health, and be sure to contact your doctor if:    You think your hearing is getting worse.     You have new symptoms, such as dizziness or nausea.   Where can you learn more?  Go to https://www.PEM[employer].net/patiented  Enter R798 in the search box to learn more about \"Hearing Loss: Care Instructions.\"  Current as of: September 27, 2023               Content Version: 14.0    3583-7262 [employer].   Care instructions adapted under license by your healthcare professional. If you have questions about a medical condition or this instruction, always ask your healthcare professional. [employer] disclaims any warranty or liability for your use of this information.       Get PCV 20 at your pharmacy.  "

## 2024-07-24 NOTE — LETTER
July 29, 2024      Sommer Jaimes  [address] [place] [address]        Brian Novoa,     Here are my comments about your recent results:   -Liver and gallbladder tests are normal (ALT,AST, Alk phos, bilirubin), kidney function is normal (Cr, GFR), sodium is normal, potassium is normal, calcium is normal, glucose is normal.   -Microalbumin (urine protein) level is elevated. This is suggestive of early damage to your kidneys from high blood pressure.  ADVISE: avoiding anti-inflamatory agents such as ibuprofen (Advil, Motrin) or naproxen (Aleve) as much as possible, keeping your blood pressure in a normal range, and repeat this test in 1 year.     Stable cholesterol.     For additional lab test information, labtestsonline.org is an excellent reference..     Please let us know if you have any questions or concerns.      Regards,   Andria Sharma MD     Resulted Orders   Lipid panel reflex to direct LDL Fasting   Result Value Ref Range    Cholesterol 217 (H) <200 mg/dL    Triglycerides 271 (H) <150 mg/dL    Direct Measure HDL 57 >=50 mg/dL    LDL Cholesterol Calculated 106 (H) <=100 mg/dL    Non HDL Cholesterol 160 (H) <130 mg/dL    Patient Fasting > 8hrs? Yes     Narrative    Cholesterol  Desirable:  <200 mg/dL    Triglycerides  Normal:  Less than 150 mg/dL  Borderline High:  150-199 mg/dL  High:  200-499 mg/dL  Very High:  Greater than or equal to 500 mg/dL    Direct Measure HDL  Female:  Greater than or equal to 50 mg/dL   Male:  Greater than or equal to 40 mg/dL    LDL Cholesterol  Desirable:  <100mg/dL  Above Desirable:  100-129 mg/dL   Borderline High:  130-159 mg/dL   High:  160-189 mg/dL   Very High:  >= 190 mg/dL    Non HDL Cholesterol  Desirable:  130 mg/dL  Above Desirable:  130-159 mg/dL  Borderline High:  160-189 mg/dL  High:  190-219 mg/dL  Very High:  Greater than or equal to 220 mg/dL   Albumin Random Urine Quantitative with Creat Ratio   Result Value Ref Range    Creatinine Urine mg/dL  147.0 mg/dL      Comment:      The reference ranges have not been established in urine creatinine. The results should be integrated into the clinical context for interpretation.    Albumin Urine mg/L 68.3 mg/L      Comment:      The reference ranges have not been established in urine albumin. The results should be integrated into the clinical context for interpretation.    Albumin Urine mg/g Cr 46.46 (H) 0.00 - 25.00 mg/g Cr      Comment:      Microalbuminuria is defined as an albumin:creatinine ratio of 17 to 299 for males and 25 to 299 for females. A ratio of albumin:creatinine of 300 or higher is indicative of overt proteinuria.  Due to biologic variability, positive results should be confirmed by a second, first-morning random or 24-hour timed urine specimen. If there is discrepancy, a third specimen is recommended. When 2 out of 3 results are in the microalbuminuria range, this is evidence for incipient nephropathy and warrants increased efforts at glucose control, blood pressure control, and institution of therapy with an angiotensin-converting-enzyme (ACE) inhibitor (if the patient can tolerate it).     Comprehensive metabolic panel (BMP + Alb, Alk Phos, ALT, AST, Total. Bili, TP)   Result Value Ref Range    Sodium 140 135 - 145 mmol/L    Potassium 4.7 3.4 - 5.3 mmol/L    Carbon Dioxide (CO2) 27 22 - 29 mmol/L    Anion Gap 12 7 - 15 mmol/L    Urea Nitrogen 23.0 8.0 - 23.0 mg/dL    Creatinine 1.14 (H) 0.51 - 0.95 mg/dL    GFR Estimate 47 (L) >60 mL/min/1.73m2      Comment:      eGFR calculated using 2021 CKD-EPI equation.    Calcium 9.8 8.8 - 10.4 mg/dL      Comment:      Reference intervals for this test were updated on 7/16/2024 to reflect our healthy population more accurately. There may be differences in the flagging of prior results with similar values performed with this method. Those prior results can be interpreted in the context of the updated reference intervals.    Chloride 101 98 - 107 mmol/L     Glucose 105 (H) 70 - 99 mg/dL    Alkaline Phosphatase 110 40 - 150 U/L    AST 33 0 - 45 U/L    ALT 21 0 - 50 U/L    Protein Total 8.0 6.4 - 8.3 g/dL    Albumin 4.5 3.5 - 5.2 g/dL    Bilirubin Total 0.5 <=1.2 mg/dL    Patient Fasting > 8hrs? Yes

## 2025-07-10 NOTE — NURSING NOTE
Patient identified using two patient identifiers.  Ear exam showing wax occlusion completed by provider.  Solution: warm water was placed in the bilateral ear(s) via irrigation tool: elephant ear.    Large amt of dark hard wax removed from the right ear and small amt of light wax removed from the left ear.   Pt tolerated well.     
,DirectAddress_Unknown,DirectAddress_Unknown,DirectAddress_Unknown

## 2025-07-29 ENCOUNTER — OFFICE VISIT (OUTPATIENT)
Dept: FAMILY MEDICINE | Facility: CLINIC | Age: 85
End: 2025-07-29
Payer: COMMERCIAL

## 2025-07-29 VITALS
SYSTOLIC BLOOD PRESSURE: 146 MMHG | HEART RATE: 57 BPM | DIASTOLIC BLOOD PRESSURE: 84 MMHG | BODY MASS INDEX: 28.42 KG/M2 | TEMPERATURE: 98.1 F | RESPIRATION RATE: 24 BRPM | WEIGHT: 166.5 LBS | OXYGEN SATURATION: 95 % | HEIGHT: 64 IN

## 2025-07-29 DIAGNOSIS — N18.31 STAGE 3A CHRONIC KIDNEY DISEASE (H): ICD-10-CM

## 2025-07-29 DIAGNOSIS — Z00.00 ENCOUNTER FOR MEDICARE ANNUAL WELLNESS EXAM: Primary | ICD-10-CM

## 2025-07-29 DIAGNOSIS — Z78.0 POSTMENOPAUSAL STATUS: ICD-10-CM

## 2025-07-29 DIAGNOSIS — G25.2 INTENTION TREMOR: ICD-10-CM

## 2025-07-29 DIAGNOSIS — Z12.31 VISIT FOR SCREENING MAMMOGRAM: ICD-10-CM

## 2025-07-29 DIAGNOSIS — K58.0 IRRITABLE BOWEL SYNDROME WITH DIARRHEA: ICD-10-CM

## 2025-07-29 LAB
ALBUMIN SERPL BCG-MCNC: 4.3 G/DL (ref 3.5–5.2)
ALP SERPL-CCNC: 102 U/L (ref 40–150)
ALT SERPL W P-5'-P-CCNC: 14 U/L (ref 0–50)
ANION GAP SERPL CALCULATED.3IONS-SCNC: 11 MMOL/L (ref 7–15)
AST SERPL W P-5'-P-CCNC: 23 U/L (ref 0–45)
BILIRUB SERPL-MCNC: 0.5 MG/DL
BUN SERPL-MCNC: 25.1 MG/DL (ref 8–23)
CALCIUM SERPL-MCNC: 9.9 MG/DL (ref 8.8–10.4)
CHLORIDE SERPL-SCNC: 102 MMOL/L (ref 98–107)
CHOLEST SERPL-MCNC: 211 MG/DL
CREAT SERPL-MCNC: 1.32 MG/DL (ref 0.51–0.95)
CREAT UR-MCNC: 147 MG/DL
EGFRCR SERPLBLD CKD-EPI 2021: 39 ML/MIN/1.73M2
EST. AVERAGE GLUCOSE BLD GHB EST-MCNC: 111 MG/DL
FASTING STATUS PATIENT QL REPORTED: YES
FASTING STATUS PATIENT QL REPORTED: YES
GLUCOSE SERPL-MCNC: 103 MG/DL (ref 70–99)
HBA1C MFR BLD: 5.5 % (ref 0–5.6)
HCO3 SERPL-SCNC: 27 MMOL/L (ref 22–29)
HDLC SERPL-MCNC: 53 MG/DL
LDLC SERPL CALC-MCNC: 106 MG/DL
MICROALBUMIN UR-MCNC: 128 MG/L
MICROALBUMIN/CREAT UR: 87.07 MG/G CR (ref 0–25)
NONHDLC SERPL-MCNC: 158 MG/DL
POTASSIUM SERPL-SCNC: 4.8 MMOL/L (ref 3.4–5.3)
PROT SERPL-MCNC: 7.8 G/DL (ref 6.4–8.3)
SODIUM SERPL-SCNC: 140 MMOL/L (ref 135–145)
TRIGL SERPL-MCNC: 262 MG/DL

## 2025-07-29 PROCEDURE — 82043 UR ALBUMIN QUANTITATIVE: CPT | Performed by: GENERAL PRACTICE

## 2025-07-29 PROCEDURE — G0439 PPPS, SUBSEQ VISIT: HCPCS | Performed by: GENERAL PRACTICE

## 2025-07-29 PROCEDURE — 3079F DIAST BP 80-89 MM HG: CPT | Performed by: GENERAL PRACTICE

## 2025-07-29 PROCEDURE — 3077F SYST BP >= 140 MM HG: CPT | Performed by: GENERAL PRACTICE

## 2025-07-29 PROCEDURE — 3049F LDL-C 100-129 MG/DL: CPT | Performed by: GENERAL PRACTICE

## 2025-07-29 PROCEDURE — 3044F HG A1C LEVEL LT 7.0%: CPT | Performed by: GENERAL PRACTICE

## 2025-07-29 PROCEDURE — 80061 LIPID PANEL: CPT | Performed by: GENERAL PRACTICE

## 2025-07-29 PROCEDURE — 99214 OFFICE O/P EST MOD 30 MIN: CPT | Mod: 25 | Performed by: GENERAL PRACTICE

## 2025-07-29 PROCEDURE — 1126F AMNT PAIN NOTED NONE PRSNT: CPT | Performed by: GENERAL PRACTICE

## 2025-07-29 PROCEDURE — 80053 COMPREHEN METABOLIC PANEL: CPT | Performed by: GENERAL PRACTICE

## 2025-07-29 PROCEDURE — 36415 COLL VENOUS BLD VENIPUNCTURE: CPT | Mod: GZ | Performed by: GENERAL PRACTICE

## 2025-07-29 PROCEDURE — 83036 HEMOGLOBIN GLYCOSYLATED A1C: CPT | Performed by: GENERAL PRACTICE

## 2025-07-29 PROCEDURE — 82570 ASSAY OF URINE CREATININE: CPT | Performed by: GENERAL PRACTICE

## 2025-07-29 RX ORDER — AMITRIPTYLINE HYDROCHLORIDE 10 MG/1
TABLET ORAL
Qty: 90 TABLET | Refills: 3 | Status: SHIPPED | OUTPATIENT
Start: 2025-07-29

## 2025-07-29 RX ORDER — PROPRANOLOL HYDROCHLORIDE 120 MG/1
120 CAPSULE, EXTENDED RELEASE ORAL DAILY
Qty: 90 CAPSULE | Refills: 3 | Status: SHIPPED | OUTPATIENT
Start: 2025-07-29

## 2025-07-29 SDOH — HEALTH STABILITY: PHYSICAL HEALTH: ON AVERAGE, HOW MANY DAYS PER WEEK DO YOU ENGAGE IN MODERATE TO STRENUOUS EXERCISE (LIKE A BRISK WALK)?: 2 DAYS

## 2025-07-29 ASSESSMENT — SOCIAL DETERMINANTS OF HEALTH (SDOH): HOW OFTEN DO YOU GET TOGETHER WITH FRIENDS OR RELATIVES?: TWICE A WEEK

## 2025-07-29 ASSESSMENT — PAIN SCALES - GENERAL: PAINLEVEL_OUTOF10: NO PAIN (0)

## 2025-07-29 NOTE — PATIENT INSTRUCTIONS
"Patient Education   Preventive Care Advice   This is general advice we often give to help people stay healthy. Your care team may have specific advice just for you. Please talk to your care team about your own preventive care needs.  Lifestyle  Exercise at least 150 minutes each week (30 minutes a day, 5 days a week).  Do muscle strengthening activities 2 days a week. These help control your weight and prevent disease.  No smoking.  Wear sunscreen to prevent skin cancer.  Take time with family and friends.  Have your home tested for radon every 2 to 5 years. Radon is a colorless, odorless gas that can harm your lungs. To learn more, go to www.health.Counts include 234 beds at the [facility].[place].us and search for \"Radon in Homes.\"  Keep guns unloaded and locked up in a safe place like a safe or gun vault, or, use a gun lock and hide the keys. Always lock away bullets separately. To learn more, visit [employer].[place].gov and search for \"safe gun storage.\"  Nutrition  Eat 5 or more servings of fruits and vegetables each day.  Try wheat bread, brown rice and whole grain pasta (instead of white bread, rice, and pasta).  Get enough calcium and vitamin D. Check the label on foods and aim for 100% of the RDA (recommended daily allowance).  Regular exams  Have a dental exam and cleaning every 6 months.  Older adults: Ask your care team how often to have memory testing.  See your health care team every year to talk about:  Any changes in your health.  Any medicines your care team has prescribed.  Preventive care, family planning, and ways to prevent chronic diseases.  Shots (vaccines)   HPV shots (up to age 26), if you've never had them before.  Hepatitis B shots (up to age 59), if you've never had them before.  COVID-19 shot: Get this shot when it's due.  Flu shot: Get a flu shot every year.  Tetanus shot: Get a tetanus shot every 10 years.  Pneumococcal, hepatitis A, and RSV shots: Ask your care team if you need these based on your risk.  Shingles shot (for age 50 and " up).  General health tests  Diabetes screening:  Starting at age 35, Get screened for diabetes at least every 3 years.  If you are younger than age 35, ask your care team if you should be screened for diabetes.  Cholesterol test: At age 39, start having a cholesterol test every 5 years, or more often if advised.  Bone density scan (DEXA): At age 50, ask your care team if you should have this scan for osteoporosis (brittle bones).  Hepatitis C: Get tested at least once in your life.  Abdominal aortic aneurysm screening: Talk to your doctor about having this screening if you:  Have ever smoked; and  Are biologically male; and  Are between the ages of 65 and 75.  STIs (sexually transmitted infections)  Before age 24: Ask your care team if you should be screened for STIs.  After age 24: Get screened for STIs if you're at risk. You are at risk for STIs (including HIV) if:  You are sexually active with more than one person.  You don't use condoms every time.  You or a partner was diagnosed with a sexually transmitted infection.  If you are at risk for HIV, ask about PrEP medicine to prevent HIV.  Get tested for HIV at least once in your life, whether you are at risk for HIV or not.  Cancer screening tests  Cervical cancer screening: If you have a cervix, begin getting regular cervical cancer screening tests at age 21. Most people who have regular screenings with normal results can stop after age 65. Talk about this with your provider.  Breast cancer scan (mammogram): If you've ever had breasts, begin having regular mammograms starting at age 40. This is a scan to check for breast cancer.  Colon cancer screening: It is important to start screening for colon cancer at age 45.  Have a colonoscopy test every 10 years (or more often if you're at risk) Or, ask your provider about stool tests like a FIT test every year or Cologuard test every 3 years.  To learn more about your testing options, visit:  www.[employer]/425683.pdf.  For help making a decision, visit: luis manuel.junior/ja12401.  Prostate cancer screening test: If you have a prostate and are age 55 to 69, ask your provider if you would benefit from a yearly prostate cancer screening test.  Lung cancer screening: If you are a current or former smoker age 50 to 80, ask your care team if ongoing lung cancer screenings are right for you.    For informational purposes only. Not to replace the advice of your health care provider. Copyright   2023 [facility]. All rights reserved. Clinically reviewed by the [facility] Transitions Program. [employer] 801144 - REV 6/25.

## 2025-07-29 NOTE — PROGRESS NOTES
"Preventive Care Visit  [facility] NISHA Sharma MD, Internal Medicine  Jul 29, 2025      Assessment & Plan     Encounter for Medicare annual wellness exam:  - Check a few labs to ensure stability. Screen for diabetes. Recheck cholesterol and albumin in urine. COVID booster and flu shot recommended in the fall.    Irritable bowel syndrome with diarrhea:  - IBS affects daily life, especially during outings. Amitriptyline may be helpful for IBS due to gut-brain axis.  - Continue amitriptyline if helpful. Use Imodium as needed for outings.    Intention tremor:  - Tremor persists despite increase in propranolol dosage from 60 mg to 120 mg. No marked improvement noted.  - Continue current management as tremor is manageable and not severely affecting daily activities.    Stage 3a chronic kidney disease (H):  - Kidney function stable over the past 4-5 years, running at 50. Proteinuria noted, mild at 46. Blood pressure control important to slow progression of kidney disease.  - Monitor blood pressure at home. Consider losartan or lisinopril for blood pressure control and kidney protection.  - Risks and side effects: Avoid ibuprofen as it can worsen kidney function.    Visit for screening mammogram:  - Continue annual mammograms. Schedule at [facility].    Postmenopausal status:  - Consider bone density test. Vitamin D and calcium supplementation recommended for bone health. Engage in resistive exercise.    Consent was obtained from the patient to use an AI documentation tool in the creation of this note.      BMI  Estimated body mass index is 28.36 kg/m  as calculated from the following:    Height as of this encounter: 1.632 m (5' 4.25\").    Weight as of this encounter: 75.5 kg (166 lb 8 oz).       Counseling  Appropriate preventive services were addressed with this patient via screening, questionnaire, or discussion as appropriate for fall prevention, nutrition, physical activity, " Tobacco-use cessation, social engagement, weight loss and cognition.  Checklist reviewing preventive services available has been given to the patient.  Reviewed patient's diet, addressing concerns and/or questions.   She is at risk for lack of exercise and has been provided with information to increase physical activity for the benefit of her well-being.   The patient was provided with written information regarding signs of hearing loss.           Subjective   Sommer is a 84 year old, presenting for the following:  Medicare Visit and Blood Draw (Fasting labs)        7/29/2025     9:09 AM   Additional Questions   Roomed by Lisa Magill, CMA   Accompanied by self         7/29/2025     9:09 AM   Patient Reported Additional Medications   Patient reports taking the following new medications none          <!--RTF_S-->  History of Present Illness  Sommer Jaimes, 84-year-old female  - Tremor present, no marked improvement after increasing propranolol from 60 mg to 120 mg; tremor persists when holding objects, not worsening, not causing food spillage  - Ongoing irritable bowel symptoms, unpredictable food triggers, alters social activities; uses over-the-counter Imodium as needed before outings  - Taking amitriptyline at night, reports improved sleep, does not feel depressed, uncertain benefit for bowel symptoms  - Reports never having high blood pressure in the past; blood pressure measured as high in July last year (156), but normal at home and at other clinics  - Proteinuria noted 2 years ago; kidney function stable over past 4-5 years, slight decline over past 3 years  - Had mild COVID infection over [place] (December 2024), treated with Paxlovid, experienced extreme diarrhea and fatigue, recovered without complications  - Last bone density scan more than 10 years ago  - Reports itchy right ear, intermittent, not bothersome  - No headaches, no difficulty with hearing except family comments about TV volume  - Weight  loss from 170 lbs last year to 166 lbs currently    <!--RTF_E-->        Right shoulder pain   Advance Care Planning    Patient states has Health Care Directive and will send to [employer].        7/29/2025   General Health   How would you rate your overall physical health? Good   Feel stress (tense, anxious, or unable to sleep) Only a little   (!) STRESS CONCERN      7/29/2025   Nutrition   Diet: Regular (no restrictions)         7/29/2025   Exercise   Days per week of moderate/strenous exercise 2 days   (!) EXERCISE CONCERN      7/29/2025   Social Factors   Frequency of gathering with friends or relatives Twice a week   Worry food won't last until get money to buy more No   Food not last or not have enough money for food? No   Do you have housing? (Housing is defined as stable permanent housing and does not include staying outside in a car, in a tent, in an abandoned building, in an overnight shelter, or couch-surfing.) Yes   Are you worried about losing your housing? No   Lack of transportation? No   Unable to get utilities (heat,electricity)? No         7/29/2025   Fall Risk   Fallen 2 or more times in the past year? No   Trouble with walking or balance? No          7/29/2025   Activities of Daily Living- Home Safety   Needs help with the following daily activites None of the above   Safety concerns in the home None of the above         7/29/2025   Dental   Dentist two times every year? Yes         7/29/2025   Hearing Screening   Hearing concerns? (!) IT'S HARD TO FOLLOW A CONVERSATION IN A NOISY RESTAURANT OR CROWDED ROOM.   Would you like a referral for hearing testing? No         7/29/2025   Driving Risk Screening   Patient/family members have concerns about driving No         7/29/2025   General Alertness/Fatigue Screening   Have you been more tired than usual lately? No         7/29/2025   Urinary Incontinence Screening   Bothered by leaking urine in past 6 months No         Today's PHQ-2 Score:        7/29/2025     9:03 AM   PHQ-2 ( 1999 Pfizer)   Q1: Little interest or pleasure in doing things 1   Q2: Feeling down, depressed or hopeless 0   PHQ-2 Score 1    Q1: Little interest or pleasure in doing things Several days   Q2: Feeling down, depressed or hopeless Not at all   PHQ-2 Score 1       Patient-reported           7/29/2025   Substance Use   Alcohol more than 3/day or more than 7/wk No   Do you have a current opioid prescription? No   How severe/bad is pain from 1 to 10? 0/10 (No Pain)   Do you use any other substances recreationally? (!) PRESCRIPTION DRUGS    (!) OTHER       Multiple values from one day are sorted in reverse-chronological order     Social History     Tobacco Use    Smoking status: Never    Smokeless tobacco: Never   Vaping Use    Vaping status: Never Used   Substance Use Topics    Alcohol use: Yes     Alcohol/week: 0.0 standard drinks of alcohol     Comment: very seldom    Drug use: No           7/5/2024   LAST FHS-7 RESULTS   1st degree relative breast or ovarian cancer No                Reviewed and updated as needed this visit by Provider                      Current providers sharing in care for this patient include:  Patient Care Team:  No Ref-Primary, Physician as PCP - General  Andria Sharma MD as Assigned PCP    The following health maintenance items are reviewed in Epic and correct as of today:  Health Maintenance   Topic Date Due    HEMOGLOBIN  06/21/2024    COVID-19 VACCINE (5 - 2024-25 season) 09/01/2024    BMP  07/24/2025    LIPID  07/24/2025    MICROALBUMIN  07/24/2025    ANNUAL REVIEW OF HM ORDERS  07/24/2025    MAMMO SCREENING  07/05/2025    INFLUENZA VACCINE (1) 09/01/2025    MEDICARE ANNUAL WELLNESS VISIT  07/29/2026    FALL RISK ASSESSMENT  07/29/2026    DEXA  04/12/2028    ADVANCE CARE PLANNING  07/15/2028    DTAP/TDAP/TD VACCINE (3 - Td or Tdap) 07/25/2034    PHQ-2 (once per calendar year)  Completed    PNEUMOCOCCAL VACCINE 50+ YEARS  Completed    URINALYSIS  Completed  "   HPV VACCINE (No Doses Required) Completed    ZOSTER VACCINE  Completed    RSV VACCINE  Completed    MENINGITIS VACCINE  Aged Out    COLORECTAL CANCER SCREENING  Discontinued         Review of Systems  Constitutional, HEENT, cardiovascular, pulmonary, GI, , musculoskeletal, neuro, skin, endocrine and psych systems are negative, except as otherwise noted.     Objective    Exam  BP (!) 146/84 (BP Location: Right arm, Patient Position: Sitting, Cuff Size: Adult Regular)   Pulse 57   Temp 98.1  F (36.7  C) (Oral)   Resp 24   Ht 1.632 m (5' 4.25\")   Wt 75.5 kg (166 lb 8 oz)   LMP  (LMP Unknown)   SpO2 95%   BMI 28.36 kg/m     Estimated body mass index is 28.36 kg/m  as calculated from the following:    Height as of this encounter: 1.632 m (5' 4.25\").    Weight as of this encounter: 75.5 kg (166 lb 8 oz).    Physical Exam  Constitutional:       Appearance: Normal appearance.   HENT:      Head: Normocephalic and atraumatic.      Right Ear: Tympanic membrane normal.      Left Ear: Tympanic membrane normal.      Nose: Nose normal.      Mouth/Throat:      Mouth: Mucous membranes are moist.      Pharynx: Oropharynx is clear.   Eyes:      Extraocular Movements: Extraocular movements intact.      Conjunctiva/sclera: Conjunctivae normal.   Cardiovascular:      Rate and Rhythm: Normal rate and regular rhythm.      Heart sounds: Normal heart sounds.   Pulmonary:      Effort: Pulmonary effort is normal.      Breath sounds: Normal breath sounds.   Abdominal:      General: Abdomen is flat.      Palpations: Abdomen is soft.   Musculoskeletal:         General: Normal range of motion.      Cervical back: Normal range of motion and neck supple.   Skin:     General: Skin is warm.   Neurological:      General: No focal deficit present.      Mental Status: She is alert and oriented to person, place, and time. Mental status is at baseline.   Psychiatric:         Mood and Affect: Mood and affect normal.         Speech: Speech " normal.         Behavior: Behavior normal. Behavior is cooperative.         Thought Content: Thought content normal.         Cognition and Memory: Cognition normal.         Judgment: Judgment normal.               7/29/2025   Mini Cog   Clock Draw Score 2 Normal   3 Item Recall 3 objects recalled   Mini Cog Total Score 5              Signed Electronically by: Andria Sharma MD

## 2025-07-29 NOTE — NURSING NOTE
"Chief Complaint   Patient presents with    Medicare Visit    Blood Draw     Fasting labs     Initial BP (!) 146/84 (BP Location: Right arm, Patient Position: Sitting, Cuff Size: Adult Regular)   Pulse 57   Temp 98.1  F (36.7  C) (Oral)   Resp 24   Ht 1.632 m (5' 4.25\")   Wt 75.5 kg (166 lb 8 oz)   LMP  (LMP Unknown)   SpO2 95%   BMI 28.36 kg/m   Estimated body mass index is 28.36 kg/m  as calculated from the following:    Height as of this encounter: 1.632 m (5' 4.25\").    Weight as of this encounter: 75.5 kg (166 lb 8 oz).  BP completed using cuff size regular right arm    Lisa Magill, CMA    "